# Patient Record
Sex: MALE | Race: WHITE | NOT HISPANIC OR LATINO | Employment: FULL TIME | URBAN - METROPOLITAN AREA
[De-identification: names, ages, dates, MRNs, and addresses within clinical notes are randomized per-mention and may not be internally consistent; named-entity substitution may affect disease eponyms.]

---

## 2018-01-11 NOTE — RESULT NOTES
Verified Results  * CT ABDOMEN PELVIS W CONTRAST [id number] 07:53AM William Ahumada Order Number: [id number]   Performing Comments: IV and oral contrast   - Patient Instructions: To schedule this appointment, please contact Central Scheduling at [phone]  Test Name Result Flag Reference   CT ABDOMEN PELVIS W CONTRAST (Report)     Additional finding as follows: There may be minimal pericolonic stranding adjacent to the sigmoid colon best seen on coronal images 93 through 97  Very mild acute diverticulitis is not excluded  ##cfslh   I personally discussed this result with Lanre Briones on 7/7/2016 2:06 PM    ##     CT ABDOMEN AND PELVIS WITH IV CONTRAST     INDICATION: Lower abdominal pain     COMPARISON: Report from a prior CT from 8/2/2012     TECHNIQUE: CT examination of the abdomen and pelvis was performed after the administration of intravenous contrast  This examination, like all CT scans performed in the [facility], was performed utilizing techniques to minimize    radiation dose exposure, including the use of iterative reconstruction and automated exposure control  Axial, sagittal, and coronal reformatted images were submitted for interpretation  100 cc of intravenous Omnipaque 350 was administered for this    examination  Enteric contrast was not given  FINDINGS:     ABDOMEN     LOWER CHEST: No significant abnormalities identified in the lower chest      LIVER/BILIARY TREE: Unremarkable  GALLBLADDER: No calcified gallstones  No pericholecystic inflammatory change  SPLEEN: Unremarkable  PANCREAS: Unremarkable  ADRENAL GLANDS: Unremarkable  KIDNEYS/URETERS: Unremarkable  No hydronephrosis  STOMACH AND BOWEL: There is sigmoid diverticulosis with wall thickening likely on the basis of smooth muscle hypertrophy  There is no surrounding stranding to suggest acute diverticulitis  Thickening was described on the prior report       APPENDIX: No findings to suggest appendicitis  ABDOMINOPELVIC CAVITY: No ascites or free intraperitoneal air  No lymphadenopathy  VESSELS: Unremarkable for patient's age  PELVIS     REPRODUCTIVE ORGANS: Unremarkable for patient's age  URINARY BLADDER: Unremarkable  ABDOMINAL WALL/INGUINAL REGIONS: Unremarkable  OSSEOUS STRUCTURES: No acute fracture or destructive osseous lesion  IMPRESSION:     Multiple sigmoid diverticula with wall thickening  This is likely secondary to smooth muscle hypertrophy in the setting of chronic diverticulosis  There is no inflammatory stranding to suggest acute diverticulitis  While the thickening was described    on the prior report, given the age of the patient, a sigmoidoscopy/colonoscopy is recommended to exclude underlying malignancy if this has not been recently performed       ##sigslh##sigslh            Workstation performed: [id number]     Signed by:   Mario An MD   7/7/16   100

## 2018-05-08 ENCOUNTER — OFFICE VISIT (OUTPATIENT)
Dept: INTERNAL MEDICINE CLINIC | Facility: CLINIC | Age: 53
End: 2018-05-08
Payer: COMMERCIAL

## 2018-05-08 VITALS
DIASTOLIC BLOOD PRESSURE: 82 MMHG | OXYGEN SATURATION: 97 % | SYSTOLIC BLOOD PRESSURE: 134 MMHG | WEIGHT: 187.2 LBS | BODY MASS INDEX: 32.13 KG/M2 | HEART RATE: 62 BPM

## 2018-05-08 DIAGNOSIS — Z13.228 SCREENING FOR METABOLIC DISORDER: ICD-10-CM

## 2018-05-08 DIAGNOSIS — M10.9 ACUTE GOUT INVOLVING TOE OF LEFT FOOT, UNSPECIFIED CAUSE: Primary | ICD-10-CM

## 2018-05-08 DIAGNOSIS — Z12.5 SCREENING FOR PROSTATE CANCER: ICD-10-CM

## 2018-05-08 DIAGNOSIS — Z13.0 SCREENING FOR DEFICIENCY ANEMIA: ICD-10-CM

## 2018-05-08 DIAGNOSIS — Z13.29 SCREENING FOR THYROID DISORDER: ICD-10-CM

## 2018-05-08 DIAGNOSIS — Z13.6 SCREENING FOR HEART DISEASE: ICD-10-CM

## 2018-05-08 PROCEDURE — 99214 OFFICE O/P EST MOD 30 MIN: CPT | Performed by: PHYSICIAN ASSISTANT

## 2018-05-08 RX ORDER — ACETAMINOPHEN 325 MG/1
TABLET ORAL
COMMUNITY
End: 2018-05-30

## 2018-05-08 NOTE — PROGRESS NOTES
Assessment/Plan:     gout, left great toe -I called the pharmacy to find out what the urgent care ordered for the patient  They ordered indomethacin and prednisone  I have instructed the patient to use this medication as ordered, take it with food  Follow up with Dr Margarito Damon to go over routine blood work orders which are given to the patient  No problem-specific Assessment & Plan notes found for this encounter  Diagnoses and all orders for this visit:    Acute gout involving toe of left foot, unspecified cause  -     Uric acid; Future    Screening for deficiency anemia  -     CBC and differential; Future    Screening for metabolic disorder  -     Comprehensive metabolic panel; Future    Screening for heart disease  -     Lipid panel; Future    Screening for thyroid disorder  -     TSH, 3rd generation with T4 reflex; Future    Screening for prostate cancer  -     PSA, Total Screen; Future    Other orders  -     acetaminophen (TYLENOL) 325 mg tablet; Take by mouth          Subjective:      Patient ID: Namrata Farias is a 46 y o  male  PATIENT COMES IN WITH COMPLAINTS OF PAIN IN HIS LEFT FOOT  IT IS FOCUSED AT THE BASE of his left great toe  It started a few days ago  He went to an urgent care today who told him they thought it might be gout and he should come to his doctor  He has not been seen by Dr Margarito Damon since 2012  There is no routine blood work in the chart either in epic, all scripts or [facility]  Other than his toe pain, the patient is feeling well and has no acute complaints  He has seen GI a few years ago for a colonoscopy and history of diverticulosis and diverticulitis  The following portions of the patient's history were reviewed and updated as appropriate: allergies, current medications, past family history, past medical history, past social history, past surgical history and problem list     Review of Systems   Constitutional: Negative for chills, fatigue and fever  Respiratory: Negative for cough, chest tightness and shortness of breath  Cardiovascular: Negative for chest pain and palpitations  Gastrointestinal: Negative for abdominal pain, diarrhea and nausea  Musculoskeletal: Positive for arthralgias and joint swelling  Tenderness, redness, swelling of the left great toe at the base         Objective:      /82   Pulse 62   Wt 84 9 kg (187 lb 3 2 oz)   SpO2 97%   BMI 32 13 kg/m²          Physical Exam   Constitutional: He is oriented to person, place, and time  He appears well-developed and well-nourished  HENT:   Head: Normocephalic and atraumatic  Right Ear: External ear normal    Left Ear: External ear normal    Mouth/Throat: Oropharynx is clear and moist  No oropharyngeal exudate  Eyes: Pupils are equal, round, and reactive to light  Neck: Normal range of motion  Neck supple  Cardiovascular: Normal rate, regular rhythm and normal heart sounds  Pulmonary/Chest: Effort normal and breath sounds normal  No respiratory distress  Abdominal: Soft  Bowel sounds are normal  There is no tenderness  Musculoskeletal:        Left foot: There is no tenderness  Feet:      Where indicated is tender, red and swollen   Lymphadenopathy:     He has no cervical adenopathy  Neurological: He is alert and oriented to person, place, and time  Skin: Skin is warm  Psychiatric: He has a normal mood and affect   His behavior is normal  Judgment normal

## 2018-05-10 LAB
ALBUMIN SERPL-MCNC: 4.3 G/DL (ref 3.5–5.5)
ALBUMIN/GLOB SERPL: 1.3 {RATIO} (ref 1.2–2.2)
ALP SERPL-CCNC: 46 IU/L (ref 39–117)
ALT SERPL-CCNC: 19 IU/L (ref 0–44)
AMBIG ABBREV DEFAULT: NORMAL
AMBIG ABBREV DEFAULT: NORMAL
AST SERPL-CCNC: 26 IU/L (ref 0–40)
BASOPHILS # BLD AUTO: 0 X10E3/UL (ref 0–0.2)
BASOPHILS NFR BLD AUTO: 0 %
BILIRUB SERPL-MCNC: 0.2 MG/DL (ref 0–1.2)
BUN SERPL-MCNC: 10 MG/DL (ref 6–24)
BUN/CREAT SERPL: 10 (ref 9–20)
CALCIUM SERPL-MCNC: 9.4 MG/DL (ref 8.7–10.2)
CHLORIDE SERPL-SCNC: 100 MMOL/L (ref 96–106)
CHOLEST SERPL-MCNC: 147 MG/DL (ref 100–199)
CO2 SERPL-SCNC: 21 MMOL/L (ref 18–29)
CREAT SERPL-MCNC: 1.05 MG/DL (ref 0.76–1.27)
EOSINOPHIL # BLD AUTO: 0.1 X10E3/UL (ref 0–0.4)
EOSINOPHIL NFR BLD AUTO: 1 %
ERYTHROCYTE [DISTWIDTH] IN BLOOD BY AUTOMATED COUNT: 14.1 % (ref 12.3–15.4)
GLOBULIN SER-MCNC: 3.3 G/DL (ref 1.5–4.5)
GLUCOSE SERPL-MCNC: 95 MG/DL (ref 65–99)
HCT VFR BLD AUTO: 42.8 % (ref 37.5–51)
HDLC SERPL-MCNC: 39 MG/DL
HGB BLD-MCNC: 14.4 G/DL (ref 13–17.7)
IMM GRANULOCYTES # BLD: 0 X10E3/UL (ref 0–0.1)
IMM GRANULOCYTES NFR BLD: 0 %
LDLC SERPL CALC-MCNC: 86 MG/DL (ref 0–99)
LYMPHOCYTES # BLD AUTO: 2.6 X10E3/UL (ref 0.7–3.1)
LYMPHOCYTES NFR BLD AUTO: 25 %
MCH RBC QN AUTO: 30.6 PG (ref 26.6–33)
MCHC RBC AUTO-ENTMCNC: 33.6 G/DL (ref 31.5–35.7)
MCV RBC AUTO: 91 FL (ref 79–97)
MONOCYTES # BLD AUTO: 0.7 X10E3/UL (ref 0.1–0.9)
MONOCYTES NFR BLD AUTO: 7 %
NEUTROPHILS # BLD AUTO: 7.1 X10E3/UL (ref 1.4–7)
NEUTROPHILS NFR BLD AUTO: 67 %
PLATELET # BLD AUTO: 192 X10E3/UL (ref 150–379)
POTASSIUM SERPL-SCNC: 4.8 MMOL/L (ref 3.5–5.2)
PROT SERPL-MCNC: 7.6 G/DL (ref 6–8.5)
PSA SERPL-MCNC: 1.9 NG/ML (ref 0–4)
RBC # BLD AUTO: 4.7 X10E6/UL (ref 4.14–5.8)
SL AMB EGFR AFRICAN AMERICAN: 94 ML/MIN/1.73
SL AMB EGFR NON AFRICAN AMERICAN: 81 ML/MIN/1.73
SL AMB VLDL CHOLESTEROL CALC: 22 MG/DL (ref 5–40)
SODIUM SERPL-SCNC: 141 MMOL/L (ref 134–144)
TRIGL SERPL-MCNC: 111 MG/DL (ref 0–149)
TSH SERPL DL<=0.005 MIU/L-ACNC: 2.68 UIU/ML (ref 0.45–4.5)
URATE SERPL-MCNC: 6.3 MG/DL (ref 3.7–8.6)
WBC # BLD AUTO: 10.6 X10E3/UL (ref 3.4–10.8)

## 2018-05-16 ENCOUNTER — OFFICE VISIT (OUTPATIENT)
Dept: INTERNAL MEDICINE CLINIC | Facility: CLINIC | Age: 53
End: 2018-05-16
Payer: COMMERCIAL

## 2018-05-16 VITALS
SYSTOLIC BLOOD PRESSURE: 128 MMHG | DIASTOLIC BLOOD PRESSURE: 80 MMHG | BODY MASS INDEX: 29.51 KG/M2 | HEIGHT: 67 IN | RESPIRATION RATE: 16 BRPM | OXYGEN SATURATION: 98 % | WEIGHT: 188 LBS | HEART RATE: 59 BPM

## 2018-05-16 DIAGNOSIS — M10.9 ACUTE GOUT INVOLVING TOE, UNSPECIFIED CAUSE, UNSPECIFIED LATERALITY: Primary | ICD-10-CM

## 2018-05-16 PROCEDURE — 99213 OFFICE O/P EST LOW 20 MIN: CPT | Performed by: PHYSICIAN ASSISTANT

## 2018-05-16 RX ORDER — INDOMETHACIN 50 MG/1
50 CAPSULE ORAL
COMMUNITY
Start: 2018-05-08 | End: 2018-05-30

## 2018-05-16 NOTE — PROGRESS NOTES
Assessment/Plan:      Gout-resolved  The patient's uric acid level is normal       All other blood work is normal       Patient is encouraged however to establish again with Dr Amrik Conde  The last time he was seen by him was 2012  I explained to him that he needs to see his doctor at least once every 3 years or more frequently as needed  He will make an appointment the next time his mother is due to come in  No problem-specific Assessment & Plan notes found for this encounter  There are no diagnoses linked to this encounter  Subjective:      Patient ID: Maggie Garcia is a 46 y o  male  The patient comes in to review blood work  He did his blood work last week  He was here last week for a problem with gout on his great toe  He was seen at an urgent care prior to seeing me and was given a course of prednisone and indomethacin  He took these medications and the gout improved  His blood work was completely normal including a CBC, CMP, lipid panel, TSH, PSA and uric acid level  His HDL was slightly low and he is encouraged to increase his exercise  The following portions of the patient's history were reviewed and updated as appropriate: allergies, current medications, past family history, past medical history, past social history, past surgical history and problem list     Review of Systems   Constitutional: Negative for chills, fatigue and fever  Respiratory: Negative for cough and shortness of breath  Cardiovascular: Negative for chest pain and palpitations  Gastrointestinal: Negative for abdominal pain, diarrhea and nausea  Musculoskeletal: Negative for arthralgias and joint swelling  Objective:      /80   Pulse 59   Resp 16   Ht 5' 7" (1 702 m)   Wt 85 3 kg (188 lb)   SpO2 98%   BMI 29 44 kg/m²          Physical Exam   Constitutional: He appears well-developed and well-nourished  Cardiovascular: Normal rate and normal heart sounds      Pulmonary/Chest: Effort normal and breath sounds normal    Abdominal: Soft  Bowel sounds are normal    Musculoskeletal: He exhibits no edema or tenderness

## 2018-05-30 ENCOUNTER — OFFICE VISIT (OUTPATIENT)
Dept: INTERNAL MEDICINE CLINIC | Facility: CLINIC | Age: 53
End: 2018-05-30
Payer: COMMERCIAL

## 2018-05-30 VITALS
BODY MASS INDEX: 29.19 KG/M2 | WEIGHT: 186 LBS | RESPIRATION RATE: 16 BRPM | HEIGHT: 67 IN | HEART RATE: 82 BPM | OXYGEN SATURATION: 97 % | DIASTOLIC BLOOD PRESSURE: 80 MMHG | SYSTOLIC BLOOD PRESSURE: 120 MMHG

## 2018-05-30 DIAGNOSIS — Z12.5 SCREENING FOR PROSTATE CANCER: Primary | ICD-10-CM

## 2018-05-30 PROBLEM — K57.30 DIVERTICULOSIS OF LARGE INTESTINE: Status: ACTIVE | Noted: 2018-05-30

## 2018-05-30 PROBLEM — M10.9 PODAGRA: Status: RESOLVED | Noted: 2018-05-30 | Resolved: 2018-05-30

## 2018-05-30 PROBLEM — K57.92 DIVERTICULITIS: Status: RESOLVED | Noted: 2018-05-30 | Resolved: 2018-05-30

## 2018-05-30 PROBLEM — M10.9 PODAGRA: Status: ACTIVE | Noted: 2018-05-30

## 2018-05-30 PROBLEM — Z00.00 HEALTHCARE MAINTENANCE: Status: ACTIVE | Noted: 2018-05-30

## 2018-05-30 PROCEDURE — 1036F TOBACCO NON-USER: CPT | Performed by: INTERNAL MEDICINE

## 2018-05-30 PROCEDURE — 3008F BODY MASS INDEX DOCD: CPT | Performed by: INTERNAL MEDICINE

## 2018-05-30 PROCEDURE — 99213 OFFICE O/P EST LOW 20 MIN: CPT | Performed by: INTERNAL MEDICINE

## 2018-05-30 NOTE — PROGRESS NOTES
Assessment/Plan:       There are no diagnoses linked to this encounter  Subjective:      Patient ID: Joshua Shannon is a 46 y o  male  A 26-year-old man  I had seen him 6 years ago  He was well  Then, he was seen again here by the [de-identified] assistant for acute left foot podagra x2 visits  This was treated with steroids with resolution of symptoms to about 90% but he still has some residual pain and tenderness of the left metatarsophalangeal joint 1  He is known to have diverticulosis and every now and then gets flares of  crampy left lower quadrant pain  However, he has never required any antibiotic therapy  Most recent colonoscopy done June 2016  As a part of visit assessments 2 weeks ago, he had extensive laboratory testing including PSA, all of which was normal       Currently taking only Tylenol for the podagra  Like so many people, he now works with computers  The following portions of the patient's history were reviewed and updated as appropriate:   He has a past medical history of Diverticulitis  ,   does not have a problem list on file  ,   has a past surgical history that includes No past surgeries  ,  family history includes Anemia in his mother; Asthma in his mother; COPD in his mother; Diverticulitis in his sister; Hypertension in his father; Lung cancer in his father  ,   reports that he has never smoked  He has never used smokeless tobacco  He reports that he drinks alcohol  He reports that he does not use drugs  ,  has No Known Allergies     Current Outpatient Prescriptions   Medication Sig Dispense Refill    acetaminophen (TYLENOL) 325 mg tablet Take by mouth      indomethacin (INDOCIN) 50 mg capsule Take 50 mg by mouth       No current facility-administered medications for this visit  Review of Systems   Constitutional: Negative for chills and fever  HENT: Negative for sore throat and trouble swallowing  Eyes: Negative for pain     Respiratory: Negative for cough and shortness of breath  Cardiovascular: Negative for chest pain and palpitations  Gastrointestinal: Negative for abdominal pain, blood in stool, diarrhea, nausea and vomiting  Endocrine: Negative for cold intolerance and heat intolerance  Genitourinary: Negative for dysuria, frequency and testicular pain  Musculoskeletal: Positive for arthralgias  Negative for joint swelling  Allergic/Immunologic: Negative for immunocompromised state  Neurological: Negative for dizziness, syncope and headaches  Hematological: Negative for adenopathy  Does not bruise/bleed easily  Psychiatric/Behavioral: Negative for dysphoric mood  The patient is not nervous/anxious  Objective:  Vitals:    05/30/18 1704   BP: 120/80   Pulse: 82   Resp: 16   SpO2: 97%      Physical Exam   Constitutional: He is oriented to person, place, and time  He appears well-developed and well-nourished  HENT:   Head: Normocephalic and atraumatic  Eyes: Pupils are equal, round, and reactive to light  Neck: Normal range of motion  Neck supple  No tracheal deviation present  No thyromegaly present  Cardiovascular: Normal rate, regular rhythm and normal heart sounds  Exam reveals no gallop  No murmur heard  Pulmonary/Chest: Effort normal  No respiratory distress  He has no wheezes  He has no rales  Abdominal: Soft  Bowel sounds are normal  There is no tenderness  Musculoskeletal: Normal range of motion  He exhibits tenderness  He exhibits no deformity  A bit of tenderness to palpation of the lateral aspect of the left 1st metatarsophalangeal joint but there is no visible redness warmth heat or swelling    Neurological: He is alert and oriented to person, place, and time  He has normal reflexes  Coordination normal    Skin: Skin is warm and dry  Psychiatric: He has a normal mood and affect

## 2018-05-30 NOTE — PATIENT INSTRUCTIONS
A 49-year-old man with no chronic problems with the exception of this podagra  Recommendation is to use Aleve or Advil a few times a day with the expectation that it will gradually disappear  If he gets rare to occasional episodes that is acceptable but it these episodes become more frequent or the pain becomes constant he should call    Frankly, I would say come back at the age of 47 peer

## 2019-03-29 ENCOUNTER — TELEPHONE (OUTPATIENT)
Dept: INTERNAL MEDICINE CLINIC | Facility: CLINIC | Age: 54
End: 2019-03-29

## 2019-05-16 ENCOUNTER — APPOINTMENT (EMERGENCY)
Dept: CT IMAGING | Facility: HOSPITAL | Age: 54
End: 2019-05-16
Payer: COMMERCIAL

## 2019-05-16 ENCOUNTER — HOSPITAL ENCOUNTER (EMERGENCY)
Facility: HOSPITAL | Age: 54
Discharge: HOME | End: 2019-05-17
Attending: EMERGENCY MEDICINE | Admitting: EMERGENCY MEDICINE
Payer: COMMERCIAL

## 2019-05-16 VITALS
WEIGHT: 167.99 LBS | RESPIRATION RATE: 18 BRPM | HEIGHT: 67 IN | OXYGEN SATURATION: 93 % | DIASTOLIC BLOOD PRESSURE: 77 MMHG | TEMPERATURE: 98.5 F | BODY MASS INDEX: 26.37 KG/M2 | SYSTOLIC BLOOD PRESSURE: 135 MMHG | HEART RATE: 60 BPM

## 2019-05-16 DIAGNOSIS — R11.2 NAUSEA & VOMITING: ICD-10-CM

## 2019-05-16 DIAGNOSIS — K57.92 DIVERTICULITIS: Primary | ICD-10-CM

## 2019-05-16 LAB
ALBUMIN SERPL BCP-MCNC: 3.7 G/DL (ref 3.5–5)
ALP SERPL-CCNC: 42 U/L (ref 46–116)
ALT SERPL W P-5'-P-CCNC: 31 U/L (ref 12–78)
ANION GAP SERPL CALCULATED.3IONS-SCNC: 9 MMOL/L (ref 4–13)
AST SERPL W P-5'-P-CCNC: 34 U/L (ref 5–45)
BASOPHILS # BLD AUTO: 0.03 THOUSANDS/ΜL (ref 0–0.1)
BASOPHILS NFR BLD AUTO: 0 % (ref 0–1)
BILIRUB SERPL-MCNC: 0.7 MG/DL (ref 0.2–1)
BUN SERPL-MCNC: 14 MG/DL (ref 5–25)
CALCIUM SERPL-MCNC: 9.3 MG/DL (ref 8.3–10.1)
CHLORIDE SERPL-SCNC: 101 MMOL/L (ref 100–108)
CO2 SERPL-SCNC: 30 MMOL/L (ref 21–32)
CREAT SERPL-MCNC: 1.12 MG/DL (ref 0.6–1.3)
EOSINOPHIL # BLD AUTO: 0.03 THOUSAND/ΜL (ref 0–0.61)
EOSINOPHIL NFR BLD AUTO: 0 % (ref 0–6)
ERYTHROCYTE [DISTWIDTH] IN BLOOD BY AUTOMATED COUNT: 12.4 % (ref 11.6–15.1)
GFR SERPL CREATININE-BSD FRML MDRD: 75 ML/MIN/1.73SQ M
GLUCOSE SERPL-MCNC: 95 MG/DL (ref 65–140)
HCT VFR BLD AUTO: 50.2 % (ref 36.5–49.3)
HGB BLD-MCNC: 16.7 G/DL (ref 12–17)
IMM GRANULOCYTES # BLD AUTO: 0.03 THOUSAND/UL (ref 0–0.2)
IMM GRANULOCYTES NFR BLD AUTO: 0 % (ref 0–2)
LIPASE SERPL-CCNC: 93 U/L (ref 73–393)
LYMPHOCYTES # BLD AUTO: 1.47 THOUSANDS/ΜL (ref 0.6–4.47)
LYMPHOCYTES NFR BLD AUTO: 17 % (ref 14–44)
MCH RBC QN AUTO: 30.4 PG (ref 26.8–34.3)
MCHC RBC AUTO-ENTMCNC: 33.3 G/DL (ref 31.4–37.4)
MCV RBC AUTO: 91 FL (ref 82–98)
MONOCYTES # BLD AUTO: 0.52 THOUSAND/ΜL (ref 0.17–1.22)
MONOCYTES NFR BLD AUTO: 6 % (ref 4–12)
NEUTROPHILS # BLD AUTO: 6.66 THOUSANDS/ΜL (ref 1.85–7.62)
NEUTS SEG NFR BLD AUTO: 77 % (ref 43–75)
NRBC BLD AUTO-RTO: 0 /100 WBCS
PLATELET # BLD AUTO: 230 THOUSANDS/UL (ref 149–390)
PMV BLD AUTO: 10.6 FL (ref 8.9–12.7)
POTASSIUM SERPL-SCNC: 4.5 MMOL/L (ref 3.5–5.3)
PROT SERPL-MCNC: 8.4 G/DL (ref 6.4–8.2)
RBC # BLD AUTO: 5.5 MILLION/UL (ref 3.88–5.62)
SODIUM SERPL-SCNC: 140 MMOL/L (ref 136–145)
WBC # BLD AUTO: 8.74 THOUSAND/UL (ref 4.31–10.16)

## 2019-05-16 PROCEDURE — 83690 ASSAY OF LIPASE: CPT | Performed by: EMERGENCY MEDICINE

## 2019-05-16 PROCEDURE — 96361 HYDRATE IV INFUSION ADD-ON: CPT

## 2019-05-16 PROCEDURE — 74177 CT ABD & PELVIS W/CONTRAST: CPT

## 2019-05-16 PROCEDURE — 96374 THER/PROPH/DIAG INJ IV PUSH: CPT

## 2019-05-16 PROCEDURE — 80053 COMPREHEN METABOLIC PANEL: CPT | Performed by: EMERGENCY MEDICINE

## 2019-05-16 PROCEDURE — 99284 EMERGENCY DEPT VISIT MOD MDM: CPT | Performed by: EMERGENCY MEDICINE

## 2019-05-16 PROCEDURE — 85025 COMPLETE CBC W/AUTO DIFF WBC: CPT | Performed by: EMERGENCY MEDICINE

## 2019-05-16 PROCEDURE — 96375 TX/PRO/DX INJ NEW DRUG ADDON: CPT

## 2019-05-16 PROCEDURE — 36415 COLL VENOUS BLD VENIPUNCTURE: CPT

## 2019-05-16 PROCEDURE — 99284 EMERGENCY DEPT VISIT MOD MDM: CPT

## 2019-05-16 RX ORDER — KETOROLAC TROMETHAMINE 30 MG/ML
15 INJECTION, SOLUTION INTRAMUSCULAR; INTRAVENOUS ONCE
Status: COMPLETED | OUTPATIENT
Start: 2019-05-16 | End: 2019-05-16

## 2019-05-16 RX ORDER — CIPROFLOXACIN 500 MG/1
500 TABLET, FILM COATED ORAL ONCE
Status: COMPLETED | OUTPATIENT
Start: 2019-05-16 | End: 2019-05-16

## 2019-05-16 RX ORDER — ONDANSETRON 2 MG/ML
4 INJECTION INTRAMUSCULAR; INTRAVENOUS ONCE
Status: COMPLETED | OUTPATIENT
Start: 2019-05-16 | End: 2019-05-16

## 2019-05-16 RX ORDER — METRONIDAZOLE 500 MG/1
500 TABLET ORAL ONCE
Status: COMPLETED | OUTPATIENT
Start: 2019-05-16 | End: 2019-05-16

## 2019-05-16 RX ORDER — METRONIDAZOLE 500 MG/1
500 TABLET ORAL EVERY 8 HOURS SCHEDULED
Qty: 30 TABLET | Refills: 0 | Status: SHIPPED | OUTPATIENT
Start: 2019-05-16 | End: 2019-05-26

## 2019-05-16 RX ORDER — OXYCODONE HYDROCHLORIDE AND ACETAMINOPHEN 5; 325 MG/1; MG/1
1 TABLET ORAL EVERY 4 HOURS PRN
Qty: 10 TABLET | Refills: 0 | Status: SHIPPED | OUTPATIENT
Start: 2019-05-16 | End: 2019-05-21

## 2019-05-16 RX ORDER — CIPROFLOXACIN 500 MG/1
500 TABLET, FILM COATED ORAL EVERY 12 HOURS SCHEDULED
Qty: 20 TABLET | Refills: 0 | Status: SHIPPED | OUTPATIENT
Start: 2019-05-16 | End: 2019-05-26

## 2019-05-16 RX ORDER — ONDANSETRON 4 MG/1
4 TABLET, ORALLY DISINTEGRATING ORAL EVERY 6 HOURS PRN
Qty: 20 TABLET | Refills: 0 | Status: SHIPPED | OUTPATIENT
Start: 2019-05-16 | End: 2021-08-10 | Stop reason: ALTCHOICE

## 2019-05-16 RX ADMIN — METRONIDAZOLE 500 MG: 500 TABLET, FILM COATED ORAL at 23:56

## 2019-05-16 RX ADMIN — KETOROLAC TROMETHAMINE 15 MG: 30 INJECTION, SOLUTION INTRAMUSCULAR at 20:19

## 2019-05-16 RX ADMIN — IOHEXOL 100 ML: 350 INJECTION, SOLUTION INTRAVENOUS at 20:54

## 2019-05-16 RX ADMIN — SODIUM CHLORIDE 1000 ML: 0.9 INJECTION, SOLUTION INTRAVENOUS at 20:18

## 2019-05-16 RX ADMIN — CIPROFLOXACIN HYDROCHLORIDE 500 MG: 500 TABLET, FILM COATED ORAL at 23:56

## 2019-05-16 RX ADMIN — ONDANSETRON 4 MG: 2 INJECTION INTRAMUSCULAR; INTRAVENOUS at 22:07

## 2019-05-29 ENCOUNTER — OFFICE VISIT (OUTPATIENT)
Dept: GASTROENTEROLOGY | Facility: CLINIC | Age: 54
End: 2019-05-29
Payer: COMMERCIAL

## 2019-05-29 VITALS
RESPIRATION RATE: 16 BRPM | SYSTOLIC BLOOD PRESSURE: 114 MMHG | HEIGHT: 67 IN | HEART RATE: 63 BPM | BODY MASS INDEX: 26.06 KG/M2 | DIASTOLIC BLOOD PRESSURE: 72 MMHG | WEIGHT: 166 LBS

## 2019-05-29 DIAGNOSIS — K57.92 DIVERTICULITIS: Primary | ICD-10-CM

## 2019-05-29 PROCEDURE — 99213 OFFICE O/P EST LOW 20 MIN: CPT | Performed by: PHYSICIAN ASSISTANT

## 2019-06-17 ENCOUNTER — HOSPITAL ENCOUNTER (INPATIENT)
Facility: HOSPITAL | Age: 54
LOS: 1 days | Discharge: HOME | DRG: 244 | End: 2019-06-19
Attending: EMERGENCY MEDICINE | Admitting: INTERNAL MEDICINE
Payer: COMMERCIAL

## 2019-06-17 DIAGNOSIS — Z78.9 FAILURE OF OUTPATIENT TREATMENT: ICD-10-CM

## 2019-06-17 DIAGNOSIS — K57.32 DIVERTICULITIS OF SIGMOID COLON: Primary | ICD-10-CM

## 2019-06-17 PROCEDURE — 99285 EMERGENCY DEPT VISIT HI MDM: CPT

## 2019-06-17 RX ORDER — MORPHINE SULFATE 4 MG/ML
4 INJECTION, SOLUTION INTRAMUSCULAR; INTRAVENOUS ONCE
Status: COMPLETED | OUTPATIENT
Start: 2019-06-18 | End: 2019-06-18

## 2019-06-17 RX ORDER — ONDANSETRON 2 MG/ML
4 INJECTION INTRAMUSCULAR; INTRAVENOUS ONCE
Status: COMPLETED | OUTPATIENT
Start: 2019-06-18 | End: 2019-06-18

## 2019-06-18 ENCOUNTER — APPOINTMENT (EMERGENCY)
Dept: CT IMAGING | Facility: HOSPITAL | Age: 54
DRG: 244 | End: 2019-06-18
Payer: COMMERCIAL

## 2019-06-18 LAB
ALBUMIN SERPL BCP-MCNC: 3.8 G/DL (ref 3.5–5)
ALP SERPL-CCNC: 49 U/L (ref 46–116)
ALT SERPL W P-5'-P-CCNC: 26 U/L (ref 12–78)
ANION GAP SERPL CALCULATED.3IONS-SCNC: 13 MMOL/L (ref 4–13)
ANION GAP SERPL CALCULATED.3IONS-SCNC: 13 MMOL/L (ref 4–13)
AST SERPL W P-5'-P-CCNC: 23 U/L (ref 5–45)
ATRIAL RATE: 53 BPM
BACTERIA UR QL AUTO: ABNORMAL /HPF
BASOPHILS # BLD AUTO: 0.01 THOUSANDS/ΜL (ref 0–0.1)
BASOPHILS # BLD AUTO: 0.03 THOUSANDS/ΜL (ref 0–0.1)
BASOPHILS NFR BLD AUTO: 0 % (ref 0–1)
BASOPHILS NFR BLD AUTO: 0 % (ref 0–1)
BILIRUB DIRECT SERPL-MCNC: 0.15 MG/DL (ref 0–0.2)
BILIRUB SERPL-MCNC: 0.5 MG/DL (ref 0.2–1)
BILIRUB UR QL STRIP: NEGATIVE
BUN SERPL-MCNC: 10 MG/DL (ref 5–25)
BUN SERPL-MCNC: 9 MG/DL (ref 5–25)
CALCIUM SERPL-MCNC: 8.9 MG/DL (ref 8.3–10.1)
CALCIUM SERPL-MCNC: 9.2 MG/DL (ref 8.3–10.1)
CHLORIDE SERPL-SCNC: 101 MMOL/L (ref 100–108)
CHLORIDE SERPL-SCNC: 103 MMOL/L (ref 100–108)
CLARITY UR: CLEAR
CO2 SERPL-SCNC: 25 MMOL/L (ref 21–32)
CO2 SERPL-SCNC: 26 MMOL/L (ref 21–32)
COARSE GRAN CASTS URNS QL MICRO: ABNORMAL /LPF
COLOR UR: YELLOW
CREAT SERPL-MCNC: 1.03 MG/DL (ref 0.6–1.3)
CREAT SERPL-MCNC: 1.04 MG/DL (ref 0.6–1.3)
EOSINOPHIL # BLD AUTO: 0.03 THOUSAND/ΜL (ref 0–0.61)
EOSINOPHIL # BLD AUTO: 0.03 THOUSAND/ΜL (ref 0–0.61)
EOSINOPHIL NFR BLD AUTO: 0 % (ref 0–6)
EOSINOPHIL NFR BLD AUTO: 0 % (ref 0–6)
ERYTHROCYTE [DISTWIDTH] IN BLOOD BY AUTOMATED COUNT: 12.3 % (ref 11.6–15.1)
ERYTHROCYTE [DISTWIDTH] IN BLOOD BY AUTOMATED COUNT: 12.5 % (ref 11.6–15.1)
GFR SERPL CREATININE-BSD FRML MDRD: 82 ML/MIN/1.73SQ M
GFR SERPL CREATININE-BSD FRML MDRD: 83 ML/MIN/1.73SQ M
GLUCOSE P FAST SERPL-MCNC: 117 MG/DL (ref 65–99)
GLUCOSE SERPL-MCNC: 117 MG/DL (ref 65–140)
GLUCOSE SERPL-MCNC: 121 MG/DL (ref 65–140)
GLUCOSE UR STRIP-MCNC: NEGATIVE MG/DL
HCT VFR BLD AUTO: 40.8 % (ref 36.5–49.3)
HCT VFR BLD AUTO: 44.5 % (ref 36.5–49.3)
HGB BLD-MCNC: 13.4 G/DL (ref 12–17)
HGB BLD-MCNC: 14.7 G/DL (ref 12–17)
HGB UR QL STRIP.AUTO: NEGATIVE
HYALINE CASTS #/AREA URNS LPF: ABNORMAL /LPF
IMM GRANULOCYTES # BLD AUTO: 0.02 THOUSAND/UL (ref 0–0.2)
IMM GRANULOCYTES # BLD AUTO: 0.03 THOUSAND/UL (ref 0–0.2)
IMM GRANULOCYTES NFR BLD AUTO: 0 % (ref 0–2)
IMM GRANULOCYTES NFR BLD AUTO: 0 % (ref 0–2)
KETONES UR STRIP-MCNC: ABNORMAL MG/DL
LACTATE SERPL-SCNC: 1.4 MMOL/L (ref 0.5–2)
LEUKOCYTE ESTERASE UR QL STRIP: NEGATIVE
LIPASE SERPL-CCNC: 129 U/L (ref 73–393)
LYMPHOCYTES # BLD AUTO: 1 THOUSANDS/ΜL (ref 0.6–4.47)
LYMPHOCYTES # BLD AUTO: 1.84 THOUSANDS/ΜL (ref 0.6–4.47)
LYMPHOCYTES NFR BLD AUTO: 10 % (ref 14–44)
LYMPHOCYTES NFR BLD AUTO: 17 % (ref 14–44)
MAGNESIUM SERPL-MCNC: 1.9 MG/DL (ref 1.6–2.6)
MAGNESIUM SERPL-MCNC: 1.9 MG/DL (ref 1.6–2.6)
MCH RBC QN AUTO: 30.5 PG (ref 26.8–34.3)
MCH RBC QN AUTO: 30.6 PG (ref 26.8–34.3)
MCHC RBC AUTO-ENTMCNC: 32.8 G/DL (ref 31.4–37.4)
MCHC RBC AUTO-ENTMCNC: 33 G/DL (ref 31.4–37.4)
MCV RBC AUTO: 93 FL (ref 82–98)
MCV RBC AUTO: 93 FL (ref 82–98)
MONOCYTES # BLD AUTO: 0.31 THOUSAND/ΜL (ref 0.17–1.22)
MONOCYTES # BLD AUTO: 0.69 THOUSAND/ΜL (ref 0.17–1.22)
MONOCYTES NFR BLD AUTO: 3 % (ref 4–12)
MONOCYTES NFR BLD AUTO: 6 % (ref 4–12)
NEUTROPHILS # BLD AUTO: 8.29 THOUSANDS/ΜL (ref 1.85–7.62)
NEUTROPHILS # BLD AUTO: 9.18 THOUSANDS/ΜL (ref 1.85–7.62)
NEUTS SEG NFR BLD AUTO: 77 % (ref 43–75)
NEUTS SEG NFR BLD AUTO: 87 % (ref 43–75)
NITRITE UR QL STRIP: NEGATIVE
NON-SQ EPI CELLS URNS QL MICRO: ABNORMAL /HPF
NRBC BLD AUTO-RTO: 0 /100 WBCS
NRBC BLD AUTO-RTO: 0 /100 WBCS
P AXIS: 38 DEGREES
PH UR STRIP.AUTO: 8 [PH]
PLATELET # BLD AUTO: 181 THOUSANDS/UL (ref 149–390)
PLATELET # BLD AUTO: 186 THOUSANDS/UL (ref 149–390)
PMV BLD AUTO: 10.6 FL (ref 8.9–12.7)
PMV BLD AUTO: 10.6 FL (ref 8.9–12.7)
POTASSIUM SERPL-SCNC: 3.8 MMOL/L (ref 3.5–5.3)
POTASSIUM SERPL-SCNC: 3.8 MMOL/L (ref 3.5–5.3)
PR INTERVAL: 140 MS
PROT SERPL-MCNC: 8.7 G/DL (ref 6.4–8.2)
PROT UR STRIP-MCNC: ABNORMAL MG/DL
QRS AXIS: 34 DEGREES
QRSD INTERVAL: 82 MS
QT INTERVAL: 460 MS
QTC INTERVAL: 431 MS
RBC # BLD AUTO: 4.4 MILLION/UL (ref 3.88–5.62)
RBC # BLD AUTO: 4.81 MILLION/UL (ref 3.88–5.62)
RBC #/AREA URNS AUTO: ABNORMAL /HPF
SODIUM SERPL-SCNC: 140 MMOL/L (ref 136–145)
SODIUM SERPL-SCNC: 141 MMOL/L (ref 136–145)
SP GR UR STRIP.AUTO: 1.02 (ref 1–1.03)
T WAVE AXIS: -21 DEGREES
TROPONIN I SERPL-MCNC: <0.02 NG/ML
UROBILINOGEN UR QL STRIP.AUTO: 0.2 E.U./DL
VENTRICULAR RATE: 53 BPM
WBC # BLD AUTO: 10.57 THOUSAND/UL (ref 4.31–10.16)
WBC # BLD AUTO: 10.89 THOUSAND/UL (ref 4.31–10.16)
WBC #/AREA URNS AUTO: ABNORMAL /HPF

## 2019-06-18 PROCEDURE — 80048 BASIC METABOLIC PNL TOTAL CA: CPT | Performed by: EMERGENCY MEDICINE

## 2019-06-18 PROCEDURE — 36415 COLL VENOUS BLD VENIPUNCTURE: CPT | Performed by: EMERGENCY MEDICINE

## 2019-06-18 PROCEDURE — 80076 HEPATIC FUNCTION PANEL: CPT | Performed by: EMERGENCY MEDICINE

## 2019-06-18 PROCEDURE — 93005 ELECTROCARDIOGRAM TRACING: CPT

## 2019-06-18 PROCEDURE — 85025 COMPLETE CBC W/AUTO DIFF WBC: CPT | Performed by: EMERGENCY MEDICINE

## 2019-06-18 PROCEDURE — 96361 HYDRATE IV INFUSION ADD-ON: CPT

## 2019-06-18 PROCEDURE — 83605 ASSAY OF LACTIC ACID: CPT | Performed by: EMERGENCY MEDICINE

## 2019-06-18 PROCEDURE — 93010 ELECTROCARDIOGRAM REPORT: CPT | Performed by: INTERNAL MEDICINE

## 2019-06-18 PROCEDURE — 96375 TX/PRO/DX INJ NEW DRUG ADDON: CPT

## 2019-06-18 PROCEDURE — 96365 THER/PROPH/DIAG IV INF INIT: CPT

## 2019-06-18 PROCEDURE — 81001 URINALYSIS AUTO W/SCOPE: CPT | Performed by: EMERGENCY MEDICINE

## 2019-06-18 PROCEDURE — 80048 BASIC METABOLIC PNL TOTAL CA: CPT | Performed by: HOSPITALIST

## 2019-06-18 PROCEDURE — 99220 PR INITIAL OBSERVATION CARE/DAY 70 MINUTES: CPT | Performed by: HOSPITALIST

## 2019-06-18 PROCEDURE — 83735 ASSAY OF MAGNESIUM: CPT | Performed by: EMERGENCY MEDICINE

## 2019-06-18 PROCEDURE — 99254 IP/OBS CNSLTJ NEW/EST MOD 60: CPT | Performed by: INTERNAL MEDICINE

## 2019-06-18 PROCEDURE — 85025 COMPLETE CBC W/AUTO DIFF WBC: CPT | Performed by: HOSPITALIST

## 2019-06-18 PROCEDURE — 83735 ASSAY OF MAGNESIUM: CPT | Performed by: HOSPITALIST

## 2019-06-18 PROCEDURE — 83690 ASSAY OF LIPASE: CPT | Performed by: EMERGENCY MEDICINE

## 2019-06-18 PROCEDURE — 99285 EMERGENCY DEPT VISIT HI MDM: CPT | Performed by: EMERGENCY MEDICINE

## 2019-06-18 PROCEDURE — 84484 ASSAY OF TROPONIN QUANT: CPT | Performed by: EMERGENCY MEDICINE

## 2019-06-18 PROCEDURE — 74177 CT ABD & PELVIS W/CONTRAST: CPT

## 2019-06-18 RX ORDER — SODIUM CHLORIDE 9 MG/ML
75 INJECTION, SOLUTION INTRAVENOUS CONTINUOUS
Status: DISCONTINUED | OUTPATIENT
Start: 2019-06-18 | End: 2019-06-19 | Stop reason: HOSPADM

## 2019-06-18 RX ORDER — ACETAMINOPHEN 325 MG/1
650 TABLET ORAL EVERY 6 HOURS PRN
Status: DISCONTINUED | OUTPATIENT
Start: 2019-06-18 | End: 2019-06-19 | Stop reason: HOSPADM

## 2019-06-18 RX ORDER — ONDANSETRON 4 MG/1
4 TABLET, ORALLY DISINTEGRATING ORAL EVERY 6 HOURS PRN
Status: DISCONTINUED | OUTPATIENT
Start: 2019-06-18 | End: 2019-06-19 | Stop reason: HOSPADM

## 2019-06-18 RX ORDER — DICYCLOMINE HYDROCHLORIDE 10 MG/1
10 CAPSULE ORAL EVERY 6 HOURS PRN
Status: DISCONTINUED | OUTPATIENT
Start: 2019-06-18 | End: 2019-06-19 | Stop reason: HOSPADM

## 2019-06-18 RX ADMIN — ENOXAPARIN SODIUM 40 MG: 40 INJECTION SUBCUTANEOUS at 08:19

## 2019-06-18 RX ADMIN — PIPERACILLIN SODIUM,TAZOBACTAM SODIUM 3.38 G: 3; .375 INJECTION, POWDER, FOR SOLUTION INTRAVENOUS at 07:53

## 2019-06-18 RX ADMIN — MORPHINE SULFATE 4 MG: 4 INJECTION INTRAVENOUS at 00:35

## 2019-06-18 RX ADMIN — SODIUM CHLORIDE 125 ML/HR: 0.9 INJECTION, SOLUTION INTRAVENOUS at 06:12

## 2019-06-18 RX ADMIN — PIPERACILLIN SODIUM,TAZOBACTAM SODIUM 4.5 G: 4; .5 INJECTION, POWDER, FOR SOLUTION INTRAVENOUS at 01:07

## 2019-06-18 RX ADMIN — PIPERACILLIN SODIUM,TAZOBACTAM SODIUM 3.38 G: 3; .375 INJECTION, POWDER, FOR SOLUTION INTRAVENOUS at 15:34

## 2019-06-18 RX ADMIN — PIPERACILLIN SODIUM,TAZOBACTAM SODIUM 3.38 G: 3; .375 INJECTION, POWDER, FOR SOLUTION INTRAVENOUS at 18:37

## 2019-06-18 RX ADMIN — SODIUM CHLORIDE 125 ML/HR: 0.9 INJECTION, SOLUTION INTRAVENOUS at 22:36

## 2019-06-18 RX ADMIN — ONDANSETRON 4 MG: 2 INJECTION INTRAMUSCULAR; INTRAVENOUS at 00:35

## 2019-06-18 RX ADMIN — IOHEXOL 100 ML: 350 INJECTION, SOLUTION INTRAVENOUS at 01:33

## 2019-06-18 RX ADMIN — DICYCLOMINE HYDROCHLORIDE 10 MG: 10 CAPSULE ORAL at 22:39

## 2019-06-18 RX ADMIN — SODIUM CHLORIDE 1000 ML: 0.9 INJECTION, SOLUTION INTRAVENOUS at 00:14

## 2019-06-19 ENCOUNTER — TRANSITIONAL CARE MANAGEMENT (OUTPATIENT)
Dept: INTERNAL MEDICINE CLINIC | Facility: CLINIC | Age: 54
End: 2019-06-19

## 2019-06-19 ENCOUNTER — TELEPHONE (OUTPATIENT)
Dept: GASTROENTEROLOGY | Facility: CLINIC | Age: 54
End: 2019-06-19

## 2019-06-19 VITALS
WEIGHT: 161 LBS | HEART RATE: 49 BPM | OXYGEN SATURATION: 98 % | BODY MASS INDEX: 25.27 KG/M2 | RESPIRATION RATE: 18 BRPM | SYSTOLIC BLOOD PRESSURE: 136 MMHG | TEMPERATURE: 97.9 F | DIASTOLIC BLOOD PRESSURE: 74 MMHG | HEIGHT: 67 IN

## 2019-06-19 LAB
ERYTHROCYTE [DISTWIDTH] IN BLOOD BY AUTOMATED COUNT: 12.8 % (ref 11.6–15.1)
HCT VFR BLD AUTO: 37.1 % (ref 36.5–49.3)
HGB BLD-MCNC: 11.9 G/DL (ref 12–17)
MCH RBC QN AUTO: 30.7 PG (ref 26.8–34.3)
MCHC RBC AUTO-ENTMCNC: 32.1 G/DL (ref 31.4–37.4)
MCV RBC AUTO: 96 FL (ref 82–98)
PLATELET # BLD AUTO: 147 THOUSANDS/UL (ref 149–390)
PMV BLD AUTO: 10.7 FL (ref 8.9–12.7)
RBC # BLD AUTO: 3.88 MILLION/UL (ref 3.88–5.62)
WBC # BLD AUTO: 8.59 THOUSAND/UL (ref 4.31–10.16)

## 2019-06-19 PROCEDURE — 99232 SBSQ HOSP IP/OBS MODERATE 35: CPT | Performed by: INTERNAL MEDICINE

## 2019-06-19 PROCEDURE — 99238 HOSP IP/OBS DSCHRG MGMT 30/<: CPT | Performed by: NURSE PRACTITIONER

## 2019-06-19 PROCEDURE — 85027 COMPLETE CBC AUTOMATED: CPT | Performed by: PHYSICIAN ASSISTANT

## 2019-06-19 RX ORDER — AMOXICILLIN AND CLAVULANATE POTASSIUM 875; 125 MG/1; MG/1
1 TABLET, FILM COATED ORAL EVERY 8 HOURS
Qty: 42 TABLET | Refills: 0 | Status: SHIPPED | OUTPATIENT
Start: 2019-06-19 | End: 2019-07-03

## 2019-06-19 RX ORDER — DICYCLOMINE HYDROCHLORIDE 10 MG/1
10 CAPSULE ORAL EVERY 6 HOURS PRN
Qty: 10 CAPSULE | Refills: 0 | Status: SHIPPED | OUTPATIENT
Start: 2019-06-19 | End: 2021-08-10 | Stop reason: ALTCHOICE

## 2019-06-19 RX ORDER — AMOXICILLIN AND CLAVULANATE POTASSIUM 875; 125 MG/1; MG/1
1 TABLET, FILM COATED ORAL EVERY 8 HOURS
Status: DISCONTINUED | OUTPATIENT
Start: 2019-06-19 | End: 2019-06-19 | Stop reason: HOSPADM

## 2019-06-19 RX ADMIN — PIPERACILLIN SODIUM,TAZOBACTAM SODIUM 3.38 G: 3; .375 INJECTION, POWDER, FOR SOLUTION INTRAVENOUS at 06:25

## 2019-06-19 RX ADMIN — SODIUM CHLORIDE 125 ML/HR: 0.9 INJECTION, SOLUTION INTRAVENOUS at 06:26

## 2019-06-19 RX ADMIN — ENOXAPARIN SODIUM 40 MG: 40 INJECTION SUBCUTANEOUS at 10:53

## 2019-06-19 RX ADMIN — PIPERACILLIN SODIUM,TAZOBACTAM SODIUM 3.38 G: 3; .375 INJECTION, POWDER, FOR SOLUTION INTRAVENOUS at 02:00

## 2019-06-19 RX ADMIN — ACETAMINOPHEN 650 MG: 325 TABLET, FILM COATED ORAL at 10:53

## 2019-06-25 ENCOUNTER — TELEPHONE (OUTPATIENT)
Dept: INTERNAL MEDICINE CLINIC | Facility: CLINIC | Age: 54
End: 2019-06-25

## 2020-01-30 ENCOUNTER — TELEPHONE (OUTPATIENT)
Dept: INTERNAL MEDICINE CLINIC | Facility: CLINIC | Age: 55
End: 2020-01-30

## 2021-08-10 ENCOUNTER — OFFICE VISIT (OUTPATIENT)
Dept: INTERNAL MEDICINE CLINIC | Facility: CLINIC | Age: 56
End: 2021-08-10

## 2021-08-10 VITALS
OXYGEN SATURATION: 100 % | TEMPERATURE: 98.1 F | HEIGHT: 67 IN | WEIGHT: 148.6 LBS | DIASTOLIC BLOOD PRESSURE: 72 MMHG | HEART RATE: 80 BPM | BODY MASS INDEX: 23.32 KG/M2 | SYSTOLIC BLOOD PRESSURE: 118 MMHG

## 2021-08-10 DIAGNOSIS — Z00.00 HEALTHCARE MAINTENANCE: ICD-10-CM

## 2021-08-10 DIAGNOSIS — Z12.5 PROSTATE CANCER SCREENING: ICD-10-CM

## 2021-08-10 DIAGNOSIS — K57.92 DIVERTICULITIS: ICD-10-CM

## 2021-08-10 DIAGNOSIS — I10 ESSENTIAL HYPERTENSION: Primary | ICD-10-CM

## 2021-08-10 PROCEDURE — 99213 OFFICE O/P EST LOW 20 MIN: CPT | Performed by: INTERNAL MEDICINE

## 2021-08-10 RX ORDER — METRONIDAZOLE 500 MG/1
500 TABLET ORAL EVERY 8 HOURS SCHEDULED
Qty: 30 TABLET | Refills: 0 | Status: SHIPPED | OUTPATIENT
Start: 2021-08-10 | End: 2021-08-20

## 2021-08-10 RX ORDER — CIPROFLOXACIN 500 MG/1
500 TABLET, FILM COATED ORAL EVERY 12 HOURS SCHEDULED
Qty: 20 TABLET | Refills: 0 | Status: SHIPPED | OUTPATIENT
Start: 2021-08-10 | End: 2021-08-20

## 2021-08-10 RX ORDER — AMLODIPINE BESYLATE 5 MG/1
5 TABLET ORAL DAILY
COMMUNITY
Start: 2021-07-20 | End: 2021-08-10 | Stop reason: SDUPTHER

## 2021-08-10 RX ORDER — AMLODIPINE BESYLATE 5 MG/1
5 TABLET ORAL DAILY
Qty: 100 TABLET | Refills: 3 | Status: SHIPPED | OUTPATIENT
Start: 2021-08-10

## 2021-08-10 NOTE — PROGRESS NOTES
Assessment/Plan:       Diagnoses and all orders for this visit:    Essential hypertension    Healthcare maintenance    Prostate cancer screening    Other orders  -     amLODIPine (NORVASC) 5 mg tablet; Take 5 mg by mouth daily                Subjective:      Patient ID: Sarah Menon is a 54 y o  male  Recurrent diverticulitis   A 63-year-old man  Treated twice this year for diverticulitis  Treated twice in 2019 for diverticulitis  All of these episodes documented diverticulitis without perforation  He should get his left hemicolectomy but he has no health insurance  Hypertensive treated with amlodipine      CT abdomen and pelvis acute diverticulitis without perforation and without any sign of malignancy July 21st   Laboratory testing July 21st CBC CMP normal     Electrocardiogram July 21st borderline LVH versus normal variant  Certainly no acute changes  Employed as a contractor by [employer]     No cigarettes, no alcohol, no illicit drugs     Lives by himself     Still has some residual crampy left lower quadrant pain with minimal minimal tenderness  He is also basis in [place] and his brother is a physician there  If he goes to [place] his treatment will be free and my advice is that he go there tomorrow  The following portions of the patient's history were reviewed and updated as appropriate:   He has a past medical history of Diverticulitis and Hypertension  ,  does not have any pertinent problems on file  ,   has a past surgical history that includes No past surgeries  ,  family history includes Anemia in his mother; Asthma in his mother; COPD in his mother; Diverticulitis in his sister; Hypertension in his father; Lung cancer in his father  ,   reports that he has never smoked  He has never used smokeless tobacco  He reports previous alcohol use  He reports that he does not use drugs  ,  has No Known Allergies     Current Outpatient Medications Medication Sig Dispense Refill    amLODIPine (NORVASC) 5 mg tablet Take 5 mg by mouth daily       No current facility-administered medications for this visit  Review of Systems   Gastrointestinal: Positive for abdominal pain  Musculoskeletal: Positive for arthralgias  All other systems reviewed and are negative  Objective:  Vitals:    08/10/21 0753   BP: 118/72   Pulse: 80   Temp: 98 1 °F (36 7 °C)   SpO2: 100%      Physical Exam  Constitutional:       Comments:  A thin male who appears to be the stated age   Cardiovascular:      Rate and Rhythm: Normal rate  Pulmonary:      Effort: Pulmonary effort is normal    Abdominal:      General: Bowel sounds are normal  There is no distension  Tenderness: There is abdominal tenderness  There is no guarding or rebound  Musculoskeletal:      Cervical back: Normal range of motion  Neurological:      General: No focal deficit present  Psychiatric:         Mood and Affect: Mood normal            There are no Patient Instructions on file for this visit

## 2021-08-10 NOTE — PATIENT INSTRUCTIONS
Recurrent diverticulitis  Surgical consult needed  Since he is employed by [employer] go there    However, the best plan is to go to [place] where the treatment will be  free

## 2022-01-07 ENCOUNTER — TELEPHONE (OUTPATIENT)
Dept: INTERNAL MEDICINE CLINIC | Facility: CLINIC | Age: 57
End: 2022-01-07

## 2022-01-08 ENCOUNTER — OFFICE VISIT (OUTPATIENT)
Dept: INTERNAL MEDICINE CLINIC | Facility: CLINIC | Age: 57
End: 2022-01-08
Payer: COMMERCIAL

## 2022-01-08 ENCOUNTER — APPOINTMENT (OUTPATIENT)
Dept: LAB | Facility: CLINIC | Age: 57
End: 2022-01-08
Payer: COMMERCIAL

## 2022-01-08 VITALS
BODY MASS INDEX: 24.96 KG/M2 | SYSTOLIC BLOOD PRESSURE: 100 MMHG | HEART RATE: 65 BPM | HEIGHT: 67 IN | RESPIRATION RATE: 16 BRPM | OXYGEN SATURATION: 99 % | WEIGHT: 159 LBS | TEMPERATURE: 96.6 F | DIASTOLIC BLOOD PRESSURE: 66 MMHG

## 2022-01-08 DIAGNOSIS — R19.4 CHANGE IN BOWEL HABIT: ICD-10-CM

## 2022-01-08 DIAGNOSIS — R31.0 GROSS HEMATURIA: Primary | ICD-10-CM

## 2022-01-08 DIAGNOSIS — R31.0 GROSS HEMATURIA: ICD-10-CM

## 2022-01-08 DIAGNOSIS — R10.9 LEFT FLANK PAIN: ICD-10-CM

## 2022-01-08 DIAGNOSIS — Z00.00 HEALTHCARE MAINTENANCE: ICD-10-CM

## 2022-01-08 LAB
ALBUMIN SERPL BCP-MCNC: 3.7 G/DL (ref 3.5–5)
ALP SERPL-CCNC: 48 U/L (ref 46–116)
ALT SERPL W P-5'-P-CCNC: 22 U/L (ref 12–78)
ANION GAP SERPL CALCULATED.3IONS-SCNC: 5 MMOL/L (ref 4–13)
AST SERPL W P-5'-P-CCNC: 26 U/L (ref 5–45)
BASOPHILS # BLD AUTO: 0.05 THOUSANDS/ΜL (ref 0–0.1)
BASOPHILS NFR BLD AUTO: 1 % (ref 0–1)
BILIRUB SERPL-MCNC: 1.17 MG/DL (ref 0.2–1)
BUN SERPL-MCNC: 14 MG/DL (ref 5–25)
CALCIUM SERPL-MCNC: 9.8 MG/DL (ref 8.3–10.1)
CHLORIDE SERPL-SCNC: 107 MMOL/L (ref 100–108)
CHOLEST SERPL-MCNC: 156 MG/DL
CO2 SERPL-SCNC: 28 MMOL/L (ref 21–32)
CREAT SERPL-MCNC: 1.07 MG/DL (ref 0.6–1.3)
EOSINOPHIL # BLD AUTO: 0.43 THOUSAND/ΜL (ref 0–0.61)
EOSINOPHIL NFR BLD AUTO: 6 % (ref 0–6)
ERYTHROCYTE [DISTWIDTH] IN BLOOD BY AUTOMATED COUNT: 12.1 % (ref 11.6–15.1)
GFR SERPL CREATININE-BSD FRML MDRD: 77 ML/MIN/1.73SQ M
GLUCOSE P FAST SERPL-MCNC: 99 MG/DL (ref 65–99)
HCT VFR BLD AUTO: 42.8 % (ref 36.5–49.3)
HCV AB SER QL: NORMAL
HDLC SERPL-MCNC: 39 MG/DL
HGB BLD-MCNC: 14.1 G/DL (ref 12–17)
IMM GRANULOCYTES # BLD AUTO: 0.03 THOUSAND/UL (ref 0–0.2)
IMM GRANULOCYTES NFR BLD AUTO: 0 % (ref 0–2)
LDLC SERPL CALC-MCNC: 103 MG/DL (ref 0–100)
LYMPHOCYTES # BLD AUTO: 1.94 THOUSANDS/ΜL (ref 0.6–4.47)
LYMPHOCYTES NFR BLD AUTO: 28 % (ref 14–44)
MCH RBC QN AUTO: 30.9 PG (ref 26.8–34.3)
MCHC RBC AUTO-ENTMCNC: 32.9 G/DL (ref 31.4–37.4)
MCV RBC AUTO: 94 FL (ref 82–98)
MONOCYTES # BLD AUTO: 0.64 THOUSAND/ΜL (ref 0.17–1.22)
MONOCYTES NFR BLD AUTO: 9 % (ref 4–12)
NEUTROPHILS # BLD AUTO: 3.95 THOUSANDS/ΜL (ref 1.85–7.62)
NEUTS SEG NFR BLD AUTO: 56 % (ref 43–75)
NRBC BLD AUTO-RTO: 0 /100 WBCS
PLATELET # BLD AUTO: 214 THOUSANDS/UL (ref 149–390)
PMV BLD AUTO: 10.7 FL (ref 8.9–12.7)
POTASSIUM SERPL-SCNC: 3.7 MMOL/L (ref 3.5–5.3)
PROT SERPL-MCNC: 8.5 G/DL (ref 6.4–8.2)
PSA SERPL-MCNC: 2.8 NG/ML (ref 0–4)
RBC # BLD AUTO: 4.56 MILLION/UL (ref 3.88–5.62)
SODIUM SERPL-SCNC: 140 MMOL/L (ref 136–145)
TRIGL SERPL-MCNC: 72 MG/DL
TSH SERPL DL<=0.05 MIU/L-ACNC: 1.17 UIU/ML (ref 0.36–3.74)
WBC # BLD AUTO: 7.04 THOUSAND/UL (ref 4.31–10.16)

## 2022-01-08 PROCEDURE — 3078F DIAST BP <80 MM HG: CPT | Performed by: INTERNAL MEDICINE

## 2022-01-08 PROCEDURE — 84153 ASSAY OF PSA TOTAL: CPT

## 2022-01-08 PROCEDURE — 87389 HIV-1 AG W/HIV-1&-2 AB AG IA: CPT

## 2022-01-08 PROCEDURE — 80061 LIPID PANEL: CPT

## 2022-01-08 PROCEDURE — 36415 COLL VENOUS BLD VENIPUNCTURE: CPT

## 2022-01-08 PROCEDURE — 99214 OFFICE O/P EST MOD 30 MIN: CPT | Performed by: INTERNAL MEDICINE

## 2022-01-08 PROCEDURE — 84443 ASSAY THYROID STIM HORMONE: CPT

## 2022-01-08 PROCEDURE — 86803 HEPATITIS C AB TEST: CPT

## 2022-01-08 PROCEDURE — 85025 COMPLETE CBC W/AUTO DIFF WBC: CPT

## 2022-01-08 PROCEDURE — 80053 COMPREHEN METABOLIC PANEL: CPT

## 2022-01-08 PROCEDURE — 3074F SYST BP LT 130 MM HG: CPT | Performed by: INTERNAL MEDICINE

## 2022-01-08 NOTE — PROGRESS NOTES
Assessment/Plan:       Diagnoses and all orders for this visit:    Gross hematuria  -     PSA Total, Diagnostic; Future  -     CT abdomen pelvis w contrast; Future    Left flank pain  -     CT abdomen pelvis w contrast; Future    Change in bowel habit  -     CT abdomen pelvis w contrast; Future                Subjective:      Patient ID: Aleks June is a 64 y o  male  59-year-old man with an unusual symptom   He describes intermittent ongoing almost daily left flank pain waxing and waning but not to severe for about 3 weeks  He describes occasional episodes of urinating blood   He describes what he describes as passing gas through his penis! All of these associated symptoms again in 3 weeks duration   No diarrhea or constipation but he does describe passing "white material "through the bowel movement  No fever no chills but he does describe night sweats     On exam he is not toxic in appearance at all  His abdominal exam is  normal       The following portions of the patient's history were reviewed and updated as appropriate:   He has a past medical history of Diverticulitis and Hypertension  ,  does not have any pertinent problems on file  ,   has a past surgical history that includes No past surgeries  ,  family history includes Anemia in his mother; Asthma in his mother; COPD in his mother; Diverticulitis in his sister; Hypertension in his father; Lung cancer in his father  ,   reports that he has never smoked  He has never used smokeless tobacco  He reports previous alcohol use  He reports that he does not use drugs  ,  has No Known Allergies     Current Outpatient Medications   Medication Sig Dispense Refill    amLODIPine (NORVASC) 5 mg tablet Take 1 tablet (5 mg total) by mouth daily 100 tablet 3     No current facility-administered medications for this visit  Review of Systems   Gastrointestinal: Positive for abdominal pain            Passage of "white material "per rectum occasionally Genitourinary: Positive for difficulty urinating, flank pain and hematuria  Objective:  Vitals:    01/08/22 0803   BP: 100/66   Pulse: 65   Resp: 16   Temp: (!) 96 6 °F (35 9 °C)   SpO2: 99%      Physical Exam  Constitutional:       Appearance: He is well-developed  HENT:      Head: Normocephalic and atraumatic  Eyes:      Conjunctiva/sclera: Conjunctivae normal       Pupils: Pupils are equal, round, and reactive to light  Neck:      Thyroid: No thyromegaly  Cardiovascular:      Rate and Rhythm: Normal rate and regular rhythm  Heart sounds: Normal heart sounds  No murmur heard  Pulmonary:      Effort: Pulmonary effort is normal  No respiratory distress  Breath sounds: No wheezing or rales  Abdominal:      Palpations: Abdomen is soft  Tenderness: There is no abdominal tenderness  Genitourinary:     Penis: Normal        Testes:         Right: Mass or tenderness not present  Left: Mass or tenderness not present  Musculoskeletal:         General: No deformity  Normal range of motion  Cervical back: Normal range of motion  Lymphadenopathy:      Cervical: No cervical adenopathy  Skin:     General: Skin is warm and dry  Neurological:      Mental Status: He is alert and oriented to person, place, and time  Psychiatric:         Behavior: Behavior normal          Thought Content: Thought content normal          Judgment: Judgment normal            Patient Instructions    A patient with the above complaint x3 weeks with a normal examination   Needs a SE[facility] laboratory testing and CT of abdomen plus pelvis  Further recommendations will depend upon results of the studies

## 2022-01-08 NOTE — PATIENT INSTRUCTIONS
A patient with the above complaint x3 weeks with a normal examination   Needs asap laboratory testing and CT of abdomen plus pelvis  Further recommendations will depend upon results of the studies

## 2022-01-09 LAB — HIV 1+2 AB+HIV1 P24 AG SERPL QL IA: NORMAL

## 2022-01-10 ENCOUNTER — TELEPHONE (OUTPATIENT)
Dept: INTERNAL MEDICINE CLINIC | Facility: CLINIC | Age: 57
End: 2022-01-10

## 2022-01-13 ENCOUNTER — HOSPITAL ENCOUNTER (OUTPATIENT)
Dept: CT IMAGING | Facility: HOSPITAL | Age: 57
Discharge: HOME | End: 2022-01-13
Payer: COMMERCIAL

## 2022-01-13 DIAGNOSIS — R10.9 LEFT FLANK PAIN: ICD-10-CM

## 2022-01-13 DIAGNOSIS — R31.0 GROSS HEMATURIA: ICD-10-CM

## 2022-01-13 DIAGNOSIS — R19.4 CHANGE IN BOWEL HABIT: ICD-10-CM

## 2022-01-13 PROCEDURE — 74177 CT ABD & PELVIS W/CONTRAST: CPT

## 2022-01-13 PROCEDURE — G1004 CDSM NDSC: HCPCS

## 2022-01-13 RX ADMIN — IOHEXOL 100 ML: 350 INJECTION, SOLUTION INTRAVENOUS at 07:47

## 2022-01-13 NOTE — H&P (VIEW-ONLY)
Colon and Rectal Surgery   Blade Fontana 64 y o  male MRN [id number]  Encounter: [id number]  01/14/22 8:52 AM            Assessment: Blade Fontana is a 64 y o  male who has a diverticular colovesical fistula  Plan:   Diverticulitis  He has had recurrent diverticulitis over the last 7 years  He has had multiple episodes and CT scans have shown severe sigmoid thickening  Approximately 3 weeks ago, he began to notice pneumaturia and fecaluria  He has mild lower abdominal discomfort as well  A CT confirms gas in the bladder and thickening of the dome of the bladder with surrounding sigmoid colon thickening and diverticulitis  He has been placed on Bactrim for treatment by his primary care doctor, Dr Joann Agudelo  I recommend surgical treatment with sigmoid resection and takedown of colovesical fistula  Primary anastomosis is intended  This should be done urgently but not emergently  He understands the potential risk if surgery is not performed, specifically urinary infections and risk to kidney function  Risks of surgery, including but not limited to bleeding, pain, infection, hernia formation, injury to the ureter or other internal organs requiring surgery specific to these injuries, anastomotic leak, impotence, deep vein thrombosis, pulmonary embolism, myocardial infarction or heart failure, stroke, death, need for and enterostomy, or other unspecified complications were discussed  Benefits and alternatives were discussed  Questions were answered  He agrees to the operation  My intention is to try to perform this laparoscopically  Open surgery will be used as indicated  Urological stents will be requested before surgery  Subjective     HPI    Blade Fontana is a 64 y o  male who is here today for evaluation of colovesical fistula  He reports he has noticed stool with urination since December  The CT scan on 1/13/22 showed:   1  Acute to subacute sigmoid diverticulitis  2  Abnormality between the sigmoid colon and bladder dome as described consistent with small abscess/fistula  3  Intraluminal bladder air, given history and imaging findings, this is highly concerning for presence of colovesicular fistula    His most recent colonoscopy was on 6/27/16 with Dr Leia Lara which revealed mild diverticulosis  Recommended 10 year recall     He denies a family history of colorectal cancer  Historical Information   Past Medical History:   Diagnosis Date    Diverticulitis     Hypertension      Past Surgical History:   Procedure Laterality Date    NO PAST SURGERIES         Meds/Allergies       Current Outpatient Medications:     amLODIPine (NORVASC) 5 mg tablet, Take 1 tablet (5 mg total) by mouth daily, Disp: 100 tablet, Rfl: 3    sulfamethoxazole-trimethoprim (BACTRIM DS) 800-160 mg per tablet, Take 1 tablet by mouth every 12 (twelve) hours for 7 days, Disp: 14 tablet, Rfl: 0  No Known Allergies    Social History   Social History     Substance and Sexual Activity   Drug Use No     Social History     Tobacco Use   Smoking Status Never Smoker   Smokeless Tobacco Never Used         Family History   Problem Relation Age of Onset    Asthma Mother     Anemia Mother    [facility] COPD Mother     Lung cancer Father     Hypertension Father     Diverticulitis Sister     Colon cancer Neg Hx          Review of Systems   Constitutional: Negative  Respiratory: Negative  Cardiovascular: Negative  Gastrointestinal: Positive for abdominal pain  Negative for abdominal distention, anal bleeding, blood in stool, constipation and diarrhea  Genitourinary:        Fecaluria, pneumaturia       Objective   Current Vitals:  Vitals:    01/14/22 0819   Weight: 73 kg (161 lb)   Height: 5' 7" (1 702 m)         Physical Exam  Constitutional:       Appearance: Normal appearance  Eyes:      Conjunctiva/sclera: Conjunctivae normal    Cardiovascular:      Rate and Rhythm: Normal rate and regular rhythm  Pulmonary:      Effort: Pulmonary effort is normal       Breath sounds: Normal breath sounds  Abdominal:      General: Abdomen is flat  Palpations: Abdomen is soft  Neurological:      General: No focal deficit present  Mental Status: He is alert and oriented to person, place, and time     Psychiatric:         Mood and Affect: Mood normal          Behavior: Behavior normal

## 2022-01-19 ENCOUNTER — TELEPHONE (OUTPATIENT)
Dept: INTERNAL MEDICINE CLINIC | Facility: CLINIC | Age: 57
End: 2022-01-19

## 2022-01-19 NOTE — TELEPHONE ENCOUNTER
Pt is having surgery colon perforation surgery being performed at [facility] with dr Moriah Chand    Needs medication for uti until his surgery which is scheduled 02/01/22    Sent to [facility] mauricio spence    Pt has an appt with dr Isaias Perez 01/22/22  Does he still need this appt?   Please advise, call back

## 2022-01-20 NOTE — TELEPHONE ENCOUNTER
If they want a surgical clearance then he needs the appointment  I sent in a refill on his Bactrim to the pharmacy 2 days ago  Just make sure he has it

## 2022-01-22 ENCOUNTER — LAB REQUISITION (OUTPATIENT)
Dept: LAB | Facility: HOSPITAL | Age: 57
End: 2022-01-22
Payer: COMMERCIAL

## 2022-01-22 ENCOUNTER — APPOINTMENT (OUTPATIENT)
Dept: LAB | Facility: CLINIC | Age: 57
End: 2022-01-22
Payer: COMMERCIAL

## 2022-01-22 ENCOUNTER — OFFICE VISIT (OUTPATIENT)
Dept: INTERNAL MEDICINE CLINIC | Facility: CLINIC | Age: 57
End: 2022-01-22
Payer: COMMERCIAL

## 2022-01-22 VITALS
SYSTOLIC BLOOD PRESSURE: 128 MMHG | HEIGHT: 67 IN | DIASTOLIC BLOOD PRESSURE: 86 MMHG | OXYGEN SATURATION: 99 % | RESPIRATION RATE: 16 BRPM | TEMPERATURE: 96.2 F | WEIGHT: 161.6 LBS | BODY MASS INDEX: 25.36 KG/M2 | HEART RATE: 62 BPM

## 2022-01-22 DIAGNOSIS — R82.81 PYURIA: ICD-10-CM

## 2022-01-22 DIAGNOSIS — R31.0 GROSS HEMATURIA: ICD-10-CM

## 2022-01-22 DIAGNOSIS — I10 ESSENTIAL HYPERTENSION: ICD-10-CM

## 2022-01-22 DIAGNOSIS — K57.20 DIVERTICULITIS OF COLON WITH PERFORATION: Primary | ICD-10-CM

## 2022-01-22 DIAGNOSIS — K57.92 DIVERTICULITIS: ICD-10-CM

## 2022-01-22 DIAGNOSIS — K57.92 DIVERTICULITIS OF INTESTINE, PART UNSPECIFIED, WITHOUT PERFORATION OR ABSCESS WITHOUT BLEEDING: ICD-10-CM

## 2022-01-22 DIAGNOSIS — R10.9 LEFT FLANK PAIN: ICD-10-CM

## 2022-01-22 LAB
ABO GROUP BLD: NORMAL
ALBUMIN SERPL BCP-MCNC: 3.8 G/DL (ref 3.5–5)
ALP SERPL-CCNC: 50 U/L (ref 46–116)
ALT SERPL W P-5'-P-CCNC: 26 U/L (ref 12–78)
ANION GAP SERPL CALCULATED.3IONS-SCNC: 3 MMOL/L (ref 4–13)
AST SERPL W P-5'-P-CCNC: 23 U/L (ref 5–45)
BASOPHILS # BLD AUTO: 0.07 THOUSANDS/ΜL (ref 0–0.1)
BASOPHILS NFR BLD AUTO: 1 % (ref 0–1)
BILIRUB SERPL-MCNC: 0.74 MG/DL (ref 0.2–1)
BLD GP AB SCN SERPL QL: NEGATIVE
BUN SERPL-MCNC: 11 MG/DL (ref 5–25)
CALCIUM SERPL-MCNC: 9.5 MG/DL (ref 8.3–10.1)
CHLORIDE SERPL-SCNC: 105 MMOL/L (ref 100–108)
CO2 SERPL-SCNC: 29 MMOL/L (ref 21–32)
CREAT SERPL-MCNC: 1.03 MG/DL (ref 0.6–1.3)
EOSINOPHIL # BLD AUTO: 0.42 THOUSAND/ΜL (ref 0–0.61)
EOSINOPHIL NFR BLD AUTO: 5 % (ref 0–6)
ERYTHROCYTE [DISTWIDTH] IN BLOOD BY AUTOMATED COUNT: 12.2 % (ref 11.6–15.1)
EST. AVERAGE GLUCOSE BLD GHB EST-MCNC: 120 MG/DL
GFR SERPL CREATININE-BSD FRML MDRD: 80 ML/MIN/1.73SQ M
GLUCOSE P FAST SERPL-MCNC: 90 MG/DL (ref 65–99)
HBA1C MFR BLD: 5.8 %
HCT VFR BLD AUTO: 45.4 % (ref 36.5–49.3)
HGB BLD-MCNC: 14.3 G/DL (ref 12–17)
IMM GRANULOCYTES # BLD AUTO: 0.03 THOUSAND/UL (ref 0–0.2)
IMM GRANULOCYTES NFR BLD AUTO: 0 % (ref 0–2)
LYMPHOCYTES # BLD AUTO: 2.4 THOUSANDS/ΜL (ref 0.6–4.47)
LYMPHOCYTES NFR BLD AUTO: 27 % (ref 14–44)
MCH RBC QN AUTO: 30.2 PG (ref 26.8–34.3)
MCHC RBC AUTO-ENTMCNC: 31.5 G/DL (ref 31.4–37.4)
MCV RBC AUTO: 96 FL (ref 82–98)
MONOCYTES # BLD AUTO: 0.54 THOUSAND/ΜL (ref 0.17–1.22)
MONOCYTES NFR BLD AUTO: 6 % (ref 4–12)
NEUTROPHILS # BLD AUTO: 5.59 THOUSANDS/ΜL (ref 1.85–7.62)
NEUTS SEG NFR BLD AUTO: 61 % (ref 43–75)
NRBC BLD AUTO-RTO: 0 /100 WBCS
PLATELET # BLD AUTO: 196 THOUSANDS/UL (ref 149–390)
PMV BLD AUTO: 10.5 FL (ref 8.9–12.7)
POTASSIUM SERPL-SCNC: 4.3 MMOL/L (ref 3.5–5.3)
PROT SERPL-MCNC: 8.5 G/DL (ref 6.4–8.2)
RBC # BLD AUTO: 4.74 MILLION/UL (ref 3.88–5.62)
RH BLD: NEGATIVE
SODIUM SERPL-SCNC: 137 MMOL/L (ref 136–145)
SPECIMEN EXPIRATION DATE: NORMAL
WBC # BLD AUTO: 9.05 THOUSAND/UL (ref 4.31–10.16)

## 2022-01-22 PROCEDURE — 86901 BLOOD TYPING SEROLOGIC RH(D): CPT | Performed by: COLON & RECTAL SURGERY

## 2022-01-22 PROCEDURE — 99214 OFFICE O/P EST MOD 30 MIN: CPT | Performed by: INTERNAL MEDICINE

## 2022-01-22 PROCEDURE — 85025 COMPLETE CBC W/AUTO DIFF WBC: CPT

## 2022-01-22 PROCEDURE — 80053 COMPREHEN METABOLIC PANEL: CPT

## 2022-01-22 PROCEDURE — 86850 RBC ANTIBODY SCREEN: CPT | Performed by: COLON & RECTAL SURGERY

## 2022-01-22 PROCEDURE — 83036 HEMOGLOBIN GLYCOSYLATED A1C: CPT

## 2022-01-22 PROCEDURE — 1036F TOBACCO NON-USER: CPT | Performed by: INTERNAL MEDICINE

## 2022-01-22 PROCEDURE — 36415 COLL VENOUS BLD VENIPUNCTURE: CPT

## 2022-01-22 PROCEDURE — 93000 ELECTROCARDIOGRAM COMPLETE: CPT | Performed by: INTERNAL MEDICINE

## 2022-01-22 PROCEDURE — 3008F BODY MASS INDEX DOCD: CPT | Performed by: INTERNAL MEDICINE

## 2022-01-22 PROCEDURE — 86900 BLOOD TYPING SEROLOGIC ABO: CPT | Performed by: COLON & RECTAL SURGERY

## 2022-01-22 PROCEDURE — 87086 URINE CULTURE/COLONY COUNT: CPT

## 2022-01-22 RX ORDER — SULFAMETHOXAZOLE AND TRIMETHOPRIM 800; 160 MG/1; MG/1
1 TABLET ORAL 2 TIMES DAILY
Qty: 20 TABLET | Refills: 0 | Status: SHIPPED | OUTPATIENT
Start: 2022-01-22 | End: 2022-01-25

## 2022-01-22 NOTE — PROGRESS NOTES
Assessment/Plan:       Diagnoses and all orders for this visit:    Diverticulitis of colon with perforation  -     sulfamethoxazole-trimethoprim (BACTRIM DS) 800-160 mg per tablet; Take 1 tablet by mouth 2 (two) times a day for 3 days    Essential hypertension  -     POCT ECG                Subjective:      Patient ID: Frank Murry is a 64 y o  male  Acute diverticulitis perforated into the bladder  I had seen this patient a couple of weeks ago  He had an unusual complaint; he had a complaint of feeling pain in the left flank and also noting urinating blood and urinating guess  He did not have left lower quadrant pain  Nevertheless, CT scanning documented the presence of a diverticular infection with fistula into the bladder  He was not all toxic and his exam was totally benign  Subsequently seen by Colorectal surgery  There care plan is colonoscopy followed by corrective surgery  He feels fine with the continued exception of passage of gas through his penis! No chest pain and no trouble breathing   Normal exam   Normal EKG      The following portions of the patient's history were reviewed and updated as appropriate:   He has a past medical history of Diverticulitis and Hypertension  ,  does not have any pertinent problems on file  ,   has a past surgical history that includes No past surgeries  ,  family history includes Anemia in his mother; Asthma in his mother; COPD in his mother; Diverticulitis in his sister; Hypertension in his father; Lung cancer in his father  ,   reports that he has never smoked  He has never used smokeless tobacco  He reports previous alcohol use  He reports that he does not use drugs  ,  has No Known Allergies     Current Outpatient Medications   Medication Sig Dispense Refill    amLODIPine (NORVASC) 5 mg tablet Take 1 tablet (5 mg total) by mouth daily 100 tablet 3    [START ON 1/31/2022] metroNIDAZOLE (FLAGYL) 500 mg tablet Take 1 tablet at 1:00pm and 1 tablet at 10:00pm the day prior to surgery  2 tablet 0    [START ON 1/31/2022] neomycin (MYCIFRADIN) 500 mg tablet Take 2 tablets at 1:00pm and 2 tablets at 10:00pm the day prior to surgery  4 tablet 0    sulfamethoxazole-trimethoprim (BACTRIM DS) 800-160 mg per tablet Take 1 tablet by mouth 2 (two) times a day for 3 days 20 tablet 0     No current facility-administered medications for this visit  Review of Systems   Constitutional: Negative for chills and fever  HENT: Negative for sore throat and trouble swallowing  Eyes: Negative for pain  Respiratory: Negative for cough and shortness of breath  Cardiovascular: Negative for chest pain and palpitations  Gastrointestinal: Negative for abdominal pain, blood in stool, diarrhea, nausea and vomiting  Endocrine: Negative for cold intolerance and heat intolerance  Genitourinary: Negative for dysuria, frequency and testicular pain  Passage of gas per his penis occasionally   Musculoskeletal: Negative for joint swelling  Allergic/Immunologic: Negative for immunocompromised state  Neurological: Negative for dizziness, syncope and headaches  Hematological: Negative for adenopathy  Does not bruise/bleed easily  Psychiatric/Behavioral: Negative for dysphoric mood  The patient is not nervous/anxious  Objective:  Vitals:    01/22/22 1016   BP: 128/86   Pulse: 62   Resp: 16   Temp: (!) 96 2 °F (35 7 °C)   SpO2: 99%      Physical Exam  Constitutional:       Appearance: He is well-developed  HENT:      Head: Normocephalic and atraumatic  Eyes:      Pupils: Pupils are equal, round, and reactive to light  Neck:      Thyroid: No thyromegaly  Trachea: No tracheal deviation  Cardiovascular:      Rate and Rhythm: Normal rate and regular rhythm  Heart sounds: Normal heart sounds  No murmur heard  No gallop  Pulmonary:      Effort: Pulmonary effort is normal  No respiratory distress  Breath sounds: No wheezing or rales  Abdominal:      General: Bowel sounds are normal       Palpations: Abdomen is soft  Tenderness: There is no abdominal tenderness  Musculoskeletal:         General: No tenderness or deformity  Normal range of motion  Cervical back: Normal range of motion and neck supple  Skin:     General: Skin is warm and dry  Neurological:      Mental Status: He is alert and oriented to person, place, and time  Coordination: Coordination normal       Deep Tendon Reflexes: Reflexes are normal and symmetric  Patient Instructions    A patient with the above history and physical  He is considered optimized for the proposed procedures including colonoscopy and surgery  He does not need any kind of pre-surgical provocative testing  There is no indication for perioperative beta-blockade    Colonoscopy and surgery should proceed as planned   continue Bactrim in the pre-surgical   For prevention of urinary tract infection

## 2022-01-22 NOTE — PATIENT INSTRUCTIONS
A patient with the above history and physical  He is considered optimized for the proposed procedures including colonoscopy and surgery  He does not need any kind of pre-surgical provocative testing  There is no indication for perioperative beta-blockade    Colonoscopy and surgery should proceed as planned   continue Bactrim in the pre-surgical   For prevention of urinary tract infection

## 2022-01-23 LAB — BACTERIA UR CULT: NORMAL

## 2022-01-25 NOTE — PRE-PROCEDURE INSTRUCTIONS
Pre-Surgery Instructions:   Medication Instructions    amLODIPine (NORVASC) 5 mg tablet WILL TAKE DOS SM SIP    [START ON 1/31/2022] metroNIDAZOLE (FLAGYL) 500 mg tablet DAY BEFORE SURG    [START ON 1/31/2022] neomycin (MYCIFRADIN) 500 mg tablet DAY BEFORE SURG    sulfamethoxazole-trimethoprim (BACTRIM DS) 800-160 mg per tablet WILL TAKE DOS SM SIP    INSTR  ON ADMIT LOC ,BRING PHOTO ID/MED LIST/INS  INFO , SHOWER REV , NO ASA/NSAIDS/VIT 1 WEEK PREOP  Pre Procedure Consult Calls    1  Are you currently experiencing symptoms of fever >100 4, cough, or shortness of breath or sore throat? ______YES    ____X_NO   If yes, then please call your PCP immediately for further direction  If you are completing this form on site, please find the safest and most direct route to the nearest exit and avoid close contact with others until you can get further advice from your PCP  2  Have you recently traveled to any foreign country or area within the [place] that has reported cases of COVID-19? ______YES      ____X_NO   IF YES, LIST LOCATION(S): __________________________   3  Have you recently been in contact with someone who is a suspected or confirmed case of COVID-19?   ______ YES   _____X_ No   INSTRUCTED ON NEW COVID VISITOR POLICY- ONLY 1 VISITOR, ALL MUST WEAR MASKS, PT VERBALIZES UNDERSTANDING

## 2022-01-31 ENCOUNTER — ANESTHESIA EVENT (OUTPATIENT)
Dept: PERIOP | Facility: HOSPITAL | Age: 57
DRG: 330 | End: 2022-01-31
Payer: COMMERCIAL

## 2022-02-01 ENCOUNTER — ANESTHESIA (OUTPATIENT)
Dept: PERIOP | Facility: HOSPITAL | Age: 57
DRG: 330 | End: 2022-02-01
Payer: COMMERCIAL

## 2022-02-01 ENCOUNTER — HOSPITAL ENCOUNTER (INPATIENT)
Facility: HOSPITAL | Age: 57
LOS: 3 days | Discharge: HOME HEALTH | DRG: 330 | End: 2022-02-04
Attending: UROLOGY | Admitting: COLON & RECTAL SURGERY
Payer: COMMERCIAL

## 2022-02-01 DIAGNOSIS — K57.20 DIVERTICULITIS OF COLON WITH PERFORATION: Primary | ICD-10-CM

## 2022-02-01 DIAGNOSIS — K57.92 DIVERTICULITIS: ICD-10-CM

## 2022-02-01 PROBLEM — N32.1 COLOVESICAL FISTULA: Status: ACTIVE | Noted: 2022-02-01

## 2022-02-01 LAB
ABO GROUP BLD: NORMAL
GLUCOSE SERPL-MCNC: 142 MG/DL (ref 65–140)
RH BLD: NEGATIVE

## 2022-02-01 PROCEDURE — 44213 LAP MOBIL SPLENIC FL ADD-ON: CPT | Performed by: COLON & RECTAL SURGERY

## 2022-02-01 PROCEDURE — C1758 CATHETER, URETERAL: HCPCS | Performed by: UROLOGY

## 2022-02-01 PROCEDURE — 0DTN0ZZ RESECTION OF SIGMOID COLON, OPEN APPROACH: ICD-10-PCS | Performed by: COLON & RECTAL SURGERY

## 2022-02-01 PROCEDURE — 44204 LAPARO PARTIAL COLECTOMY: CPT | Performed by: COLON & RECTAL SURGERY

## 2022-02-01 PROCEDURE — 88307 TISSUE EXAM BY PATHOLOGIST: CPT | Performed by: PATHOLOGY

## 2022-02-01 PROCEDURE — 0DJD8ZZ INSPECTION OF LOWER INTESTINAL TRACT, VIA NATURAL OR ARTIFICIAL OPENING ENDOSCOPIC: ICD-10-PCS | Performed by: COLON & RECTAL SURGERY

## 2022-02-01 PROCEDURE — 0TBB0ZZ EXCISION OF BLADDER, OPEN APPROACH: ICD-10-PCS | Performed by: COLON & RECTAL SURGERY

## 2022-02-01 PROCEDURE — C1769 GUIDE WIRE: HCPCS | Performed by: UROLOGY

## 2022-02-01 PROCEDURE — 45330 DIAGNOSTIC SIGMOIDOSCOPY: CPT | Performed by: COLON & RECTAL SURGERY

## 2022-02-01 PROCEDURE — NC001 PR NO CHARGE: Performed by: UROLOGY

## 2022-02-01 PROCEDURE — 0T788DZ DILATION OF BILATERAL URETERS WITH INTRALUMINAL DEVICE, VIA NATURAL OR ARTIFICIAL OPENING ENDOSCOPIC: ICD-10-PCS | Performed by: UROLOGY

## 2022-02-01 PROCEDURE — 44661 REPAIR BOWEL-BLADDER FISTULA: CPT | Performed by: COLON & RECTAL SURGERY

## 2022-02-01 PROCEDURE — 82948 REAGENT STRIP/BLOOD GLUCOSE: CPT

## 2022-02-01 PROCEDURE — 52005 CYSTO W/URTRL CATHJ: CPT | Performed by: UROLOGY

## 2022-02-01 RX ORDER — MAGNESIUM HYDROXIDE 1200 MG/15ML
LIQUID ORAL AS NEEDED
Status: DISCONTINUED | OUTPATIENT
Start: 2022-02-01 | End: 2022-02-01 | Stop reason: HOSPADM

## 2022-02-01 RX ORDER — SODIUM CHLORIDE, SODIUM LACTATE, POTASSIUM CHLORIDE, CALCIUM CHLORIDE 600; 310; 30; 20 MG/100ML; MG/100ML; MG/100ML; MG/100ML
100 INJECTION, SOLUTION INTRAVENOUS CONTINUOUS
Status: DISCONTINUED | OUTPATIENT
Start: 2022-02-01 | End: 2022-02-01

## 2022-02-01 RX ORDER — ROCURONIUM BROMIDE 10 MG/ML
INJECTION, SOLUTION INTRAVENOUS AS NEEDED
Status: DISCONTINUED | OUTPATIENT
Start: 2022-02-01 | End: 2022-02-01

## 2022-02-01 RX ORDER — ONDANSETRON 2 MG/ML
4 INJECTION INTRAMUSCULAR; INTRAVENOUS EVERY 6 HOURS PRN
Status: DISCONTINUED | OUTPATIENT
Start: 2022-02-01 | End: 2022-02-04 | Stop reason: HOSPADM

## 2022-02-01 RX ORDER — SODIUM CHLORIDE 9 MG/ML
INJECTION, SOLUTION INTRAVENOUS CONTINUOUS PRN
Status: DISCONTINUED | OUTPATIENT
Start: 2022-02-01 | End: 2022-02-01

## 2022-02-01 RX ORDER — FENTANYL CITRATE/PF 50 MCG/ML
50 SYRINGE (ML) INJECTION
Status: DISCONTINUED | OUTPATIENT
Start: 2022-02-01 | End: 2022-02-01 | Stop reason: HOSPADM

## 2022-02-01 RX ORDER — PROPOFOL 10 MG/ML
INJECTION, EMULSION INTRAVENOUS AS NEEDED
Status: DISCONTINUED | OUTPATIENT
Start: 2022-02-01 | End: 2022-02-01

## 2022-02-01 RX ORDER — HYDROMORPHONE HCL/PF 1 MG/ML
0.4 SYRINGE (ML) INJECTION
Status: DISCONTINUED | OUTPATIENT
Start: 2022-02-01 | End: 2022-02-01 | Stop reason: HOSPADM

## 2022-02-01 RX ORDER — MIDAZOLAM HYDROCHLORIDE 2 MG/2ML
INJECTION, SOLUTION INTRAMUSCULAR; INTRAVENOUS AS NEEDED
Status: DISCONTINUED | OUTPATIENT
Start: 2022-02-01 | End: 2022-02-01

## 2022-02-01 RX ORDER — HEPARIN SODIUM 5000 [USP'U]/ML
5000 INJECTION, SOLUTION INTRAVENOUS; SUBCUTANEOUS ONCE
Status: COMPLETED | OUTPATIENT
Start: 2022-02-01 | End: 2022-02-01

## 2022-02-01 RX ORDER — AMLODIPINE BESYLATE 5 MG/1
5 TABLET ORAL DAILY
Status: DISCONTINUED | OUTPATIENT
Start: 2022-02-01 | End: 2022-02-04 | Stop reason: HOSPADM

## 2022-02-01 RX ORDER — NEOSTIGMINE METHYLSULFATE 1 MG/ML
INJECTION INTRAVENOUS AS NEEDED
Status: DISCONTINUED | OUTPATIENT
Start: 2022-02-01 | End: 2022-02-01

## 2022-02-01 RX ORDER — SODIUM CHLORIDE, SODIUM LACTATE, POTASSIUM CHLORIDE, CALCIUM CHLORIDE 600; 310; 30; 20 MG/100ML; MG/100ML; MG/100ML; MG/100ML
INJECTION, SOLUTION INTRAVENOUS CONTINUOUS PRN
Status: DISCONTINUED | OUTPATIENT
Start: 2022-02-01 | End: 2022-02-01

## 2022-02-01 RX ORDER — SODIUM CHLORIDE, SODIUM LACTATE, POTASSIUM CHLORIDE, CALCIUM CHLORIDE 600; 310; 30; 20 MG/100ML; MG/100ML; MG/100ML; MG/100ML
125 INJECTION, SOLUTION INTRAVENOUS CONTINUOUS
Status: DISCONTINUED | OUTPATIENT
Start: 2022-02-01 | End: 2022-02-02

## 2022-02-01 RX ORDER — DEXAMETHASONE SODIUM PHOSPHATE 10 MG/ML
INJECTION, SOLUTION INTRAMUSCULAR; INTRAVENOUS AS NEEDED
Status: DISCONTINUED | OUTPATIENT
Start: 2022-02-01 | End: 2022-02-01

## 2022-02-01 RX ORDER — ACETAMINOPHEN 325 MG/1
975 TABLET ORAL EVERY 8 HOURS SCHEDULED
Status: DISCONTINUED | OUTPATIENT
Start: 2022-02-01 | End: 2022-02-04 | Stop reason: HOSPADM

## 2022-02-01 RX ORDER — FENTANYL CITRATE 50 UG/ML
INJECTION, SOLUTION INTRAMUSCULAR; INTRAVENOUS AS NEEDED
Status: DISCONTINUED | OUTPATIENT
Start: 2022-02-01 | End: 2022-02-01

## 2022-02-01 RX ORDER — NEOMYCIN SULFATE 500 MG/1
1000 TABLET ORAL 3 TIMES DAILY
Status: DISCONTINUED | OUTPATIENT
Start: 2022-02-01 | End: 2022-02-01

## 2022-02-01 RX ORDER — DEXMEDETOMIDINE HYDROCHLORIDE 100 UG/ML
INJECTION, SOLUTION INTRAVENOUS AS NEEDED
Status: DISCONTINUED | OUTPATIENT
Start: 2022-02-01 | End: 2022-02-01

## 2022-02-01 RX ORDER — HEPARIN SODIUM 5000 [USP'U]/ML
5000 INJECTION, SOLUTION INTRAVENOUS; SUBCUTANEOUS EVERY 8 HOURS SCHEDULED
Status: DISCONTINUED | OUTPATIENT
Start: 2022-02-01 | End: 2022-02-04 | Stop reason: HOSPADM

## 2022-02-01 RX ORDER — HYDROMORPHONE HCL/PF 1 MG/ML
SYRINGE (ML) INJECTION AS NEEDED
Status: DISCONTINUED | OUTPATIENT
Start: 2022-02-01 | End: 2022-02-01

## 2022-02-01 RX ORDER — ONDANSETRON 2 MG/ML
4 INJECTION INTRAMUSCULAR; INTRAVENOUS ONCE AS NEEDED
Status: DISCONTINUED | OUTPATIENT
Start: 2022-02-01 | End: 2022-02-01 | Stop reason: HOSPADM

## 2022-02-01 RX ORDER — METRONIDAZOLE 500 MG/1
1000 TABLET ORAL 3 TIMES DAILY
Status: DISCONTINUED | OUTPATIENT
Start: 2022-02-01 | End: 2022-02-01

## 2022-02-01 RX ORDER — ONDANSETRON 2 MG/ML
INJECTION INTRAMUSCULAR; INTRAVENOUS AS NEEDED
Status: DISCONTINUED | OUTPATIENT
Start: 2022-02-01 | End: 2022-02-01

## 2022-02-01 RX ORDER — GLYCOPYRROLATE 0.2 MG/ML
INJECTION INTRAMUSCULAR; INTRAVENOUS AS NEEDED
Status: DISCONTINUED | OUTPATIENT
Start: 2022-02-01 | End: 2022-02-01

## 2022-02-01 RX ORDER — KETAMINE HCL IN NACL, ISO-OSM 100MG/10ML
SYRINGE (ML) INJECTION AS NEEDED
Status: DISCONTINUED | OUTPATIENT
Start: 2022-02-01 | End: 2022-02-01

## 2022-02-01 RX ORDER — CEFAZOLIN SODIUM 1 G/3ML
INJECTION, POWDER, FOR SOLUTION INTRAMUSCULAR; INTRAVENOUS AS NEEDED
Status: DISCONTINUED | OUTPATIENT
Start: 2022-02-01 | End: 2022-02-01

## 2022-02-01 RX ADMIN — ROCURONIUM BROMIDE 10 MG: 50 INJECTION, SOLUTION INTRAVENOUS at 10:07

## 2022-02-01 RX ADMIN — HYDROMORPHONE HYDROCHLORIDE 0.5 MG: 1 INJECTION, SOLUTION INTRAMUSCULAR; INTRAVENOUS; SUBCUTANEOUS at 10:06

## 2022-02-01 RX ADMIN — HYDROMORPHONE HYDROCHLORIDE 0.5 MG: 1 INJECTION, SOLUTION INTRAMUSCULAR; INTRAVENOUS; SUBCUTANEOUS at 11:06

## 2022-02-01 RX ADMIN — ONDANSETRON 4 MG: 2 INJECTION INTRAMUSCULAR; INTRAVENOUS at 11:31

## 2022-02-01 RX ADMIN — SODIUM CHLORIDE 0.2 MCG/KG/HR: 900 INJECTION INTRAVENOUS at 07:53

## 2022-02-01 RX ADMIN — GLYCOPYRROLATE 0.1 MG: 0.2 INJECTION, SOLUTION INTRAMUSCULAR; INTRAVENOUS at 08:30

## 2022-02-01 RX ADMIN — DEXMEDETOMIDINE HCL 8 MCG: 100 INJECTION INTRAVENOUS at 10:14

## 2022-02-01 RX ADMIN — NEOSTIGMINE METHYLSULFATE 3 MG: 1 INJECTION INTRAVENOUS at 11:43

## 2022-02-01 RX ADMIN — SODIUM CHLORIDE: 0.9 INJECTION, SOLUTION INTRAVENOUS at 09:05

## 2022-02-01 RX ADMIN — PHENYLEPHRINE HYDROCHLORIDE 30 MCG/MIN: 10 INJECTION INTRAVENOUS at 07:53

## 2022-02-01 RX ADMIN — SODIUM CHLORIDE, SODIUM LACTATE, POTASSIUM CHLORIDE, AND CALCIUM CHLORIDE: .6; .31; .03; .02 INJECTION, SOLUTION INTRAVENOUS at 10:28

## 2022-02-01 RX ADMIN — SODIUM CHLORIDE: 0.9 INJECTION, SOLUTION INTRAVENOUS at 07:42

## 2022-02-01 RX ADMIN — Medication 10 MG: at 11:01

## 2022-02-01 RX ADMIN — ROCURONIUM BROMIDE 40 MG: 50 INJECTION, SOLUTION INTRAVENOUS at 07:39

## 2022-02-01 RX ADMIN — DEXAMETHASONE SODIUM PHOSPHATE 8 MG: 10 INJECTION, SOLUTION INTRAMUSCULAR; INTRAVENOUS at 08:10

## 2022-02-01 RX ADMIN — HYDROMORPHONE HYDROCHLORIDE 0.25 MG: 1 INJECTION, SOLUTION INTRAMUSCULAR; INTRAVENOUS; SUBCUTANEOUS at 08:58

## 2022-02-01 RX ADMIN — HYDROMORPHONE HYDROCHLORIDE 0.5 MG: 1 INJECTION, SOLUTION INTRAMUSCULAR; INTRAVENOUS; SUBCUTANEOUS at 09:01

## 2022-02-01 RX ADMIN — GLYCOPYRROLATE 0.4 MG: 0.2 INJECTION, SOLUTION INTRAMUSCULAR; INTRAVENOUS at 11:43

## 2022-02-01 RX ADMIN — Medication 10 MG: at 10:39

## 2022-02-01 RX ADMIN — ROCURONIUM BROMIDE 20 MG: 50 INJECTION, SOLUTION INTRAVENOUS at 09:05

## 2022-02-01 RX ADMIN — LIDOCAINE HYDROCHLORIDE 50 MG: 20 INJECTION INTRAVENOUS at 07:38

## 2022-02-01 RX ADMIN — ROCURONIUM BROMIDE 10 MG: 50 INJECTION, SOLUTION INTRAVENOUS at 08:39

## 2022-02-01 RX ADMIN — MIDAZOLAM 2 MG: 1 INJECTION INTRAMUSCULAR; INTRAVENOUS at 07:32

## 2022-02-01 RX ADMIN — DEXMEDETOMIDINE HCL 8 MCG: 100 INJECTION INTRAVENOUS at 10:13

## 2022-02-01 RX ADMIN — CEFAZOLIN 1000 MG: 1 INJECTION, POWDER, FOR SOLUTION INTRAMUSCULAR; INTRAVENOUS at 07:50

## 2022-02-01 RX ADMIN — FENTANYL CITRATE 50 MCG: 50 INJECTION INTRAMUSCULAR; INTRAVENOUS at 07:38

## 2022-02-01 RX ADMIN — FENTANYL CITRATE 50 MCG: 50 INJECTION INTRAMUSCULAR; INTRAVENOUS at 08:51

## 2022-02-01 RX ADMIN — HEPARIN SODIUM 5000 UNITS: 5000 INJECTION INTRAVENOUS; SUBCUTANEOUS at 07:30

## 2022-02-01 RX ADMIN — ACETAMINOPHEN 975 MG: 325 TABLET, FILM COATED ORAL at 21:47

## 2022-02-01 RX ADMIN — SODIUM CHLORIDE, SODIUM LACTATE, POTASSIUM CHLORIDE, AND CALCIUM CHLORIDE 125 ML/HR: .6; .31; .03; .02 INJECTION, SOLUTION INTRAVENOUS at 18:00

## 2022-02-01 RX ADMIN — HEPARIN SODIUM 5000 UNITS: 5000 INJECTION INTRAVENOUS; SUBCUTANEOUS at 21:48

## 2022-02-01 RX ADMIN — HYDROMORPHONE HYDROCHLORIDE 0.25 MG: 1 INJECTION, SOLUTION INTRAMUSCULAR; INTRAVENOUS; SUBCUTANEOUS at 08:53

## 2022-02-01 RX ADMIN — HYDROMORPHONE HYDROCHLORIDE: 10 INJECTION INTRAMUSCULAR; INTRAVENOUS; SUBCUTANEOUS at 12:29

## 2022-02-01 RX ADMIN — SODIUM CHLORIDE, SODIUM LACTATE, POTASSIUM CHLORIDE, AND CALCIUM CHLORIDE: .6; .31; .03; .02 INJECTION, SOLUTION INTRAVENOUS at 07:15

## 2022-02-01 RX ADMIN — PROPOFOL 150 MG: 10 INJECTION, EMULSION INTRAVENOUS at 07:39

## 2022-02-01 RX ADMIN — Medication 30 MG: at 10:21

## 2022-02-01 RX ADMIN — SODIUM CHLORIDE, SODIUM LACTATE, POTASSIUM CHLORIDE, AND CALCIUM CHLORIDE 125 ML/HR: .6; .31; .03; .02 INJECTION, SOLUTION INTRAVENOUS at 13:00

## 2022-02-01 RX ADMIN — SODIUM CHLORIDE, SODIUM LACTATE, POTASSIUM CHLORIDE, AND CALCIUM CHLORIDE 125 ML/HR: .6; .31; .03; .02 INJECTION, SOLUTION INTRAVENOUS at 21:52

## 2022-02-01 NOTE — INTERVAL H&P NOTE
H&P reviewed  After examining the patient I find no changes in the patients condition since the H&P had been written    Head, all 4s ok    Vitals:    02/01/22 0556   BP: 106/68   Pulse: 77   Resp: 16   SpO2: 98%

## 2022-02-01 NOTE — ANESTHESIA POSTPROCEDURE EVALUATION
Post-Op Assessment Note    CV Status:  Stable    Pain management: adequate     Mental Status:  Sleepy   Hydration Status:  Euvolemic   PONV Controlled:  Controlled   Airway Patency:  Patent      Post Op Vitals Reviewed: Yes      Staff: CRNA         No complications documented      BP (!) 99/46 (02/01/22 1154)    Temp 98 8 °F (37 1 °C) (02/01/22 1154)    Pulse 67 (02/01/22 1154)   Resp 18 (02/01/22 1154)    SpO2 100 % (02/01/22 1154)

## 2022-02-01 NOTE — OP NOTE
PERATIVE REPORT  PATIENT NAME: Mandeep Yang    :  1965  MRN: [id number]  Pt Location: BE OR ROOM 09    SURGERY DATE: 2022    Surgeon(s) and Role:  Panel 1:     * Katherin Ramos MD - Primary  Panel 2:     Jonathan Armstrong MD - Primary     * Josette Balndon DO - Assisting    Preop Diagnosis:  Diverticulitis [K57 92]    Post-Op Diagnosis Codes:     * Diverticulitis [K57 92]    Procedure(s) (LRB):  INSERTION URETERAL CATHETERS PREOP (Bilateral)  RESECTION COLON SIGMOID LAPAROSCOPIC HAND-ASSISTED AND TAKEDOWN OF COLOVESICAL FISTULA (N/A)    Specimen(s):  * No specimens in log *    Estimated Blood Loss:   50 mL    Drains:  NG/OG/Enteral Tube Orogastric 18 Fr Center mouth (Active)   Number of days: 0       Urethral Catheter Non-latex 16 Fr  (Active)   Number of days: 0       Ureteral Drain/Stent Left ureter 5 Fr  (Active)   Number of days: 0       Ureteral Drain/Stent Right ureter 5 Fr  (Active)   Number of days: 0       Anesthesia Type:   General    Operative Indications:  Patient with diverticulitis and colovesical fistula with ureteral stents requested for ureteral identification during surgery    Operative Findings:  Copious feculent material in bladder drained  Fistula seen at left dome of bladder, appeared 5mm in width  B/l open ended stents placed  Stents secured to chase catheter    Complications:   None    Procedure and Technique:  The patient was brought to the operating room  Anesthesia was induced  They were moved to dorsal lithotomy position  Antibiotics were confirmed  A time-out was performed identifying the correct patient, site, and procedure  A rigid 25 Citizen of the [place] cystoscope was introduced into the bladder  The urethra was unremarkable  The bladder was surveyed and had copious feculent debris   The bladder was drained and filled and drained multiple times to evacuate all feculent material   The bladder was evaluated and to a level is erythema this lesion at the left anterior dome was appreciated consistent with fistula measuring 5 mm in width  Attention was turned to the left ureteral orifice  A 5 [place] open-end catheter was used to intubate the ureteral orifice with aid of sensor wire  The 5fr catheter was passed up to the 22cm marcelo  The wire was removed  The scope was broken down and reintroduced  Attention was turned to the right ureteral orifice  A 5 [place] open-end catheter was used to intubate the ureteral orifice with aid of sensor wire  The 5fr catheter was passed up to the 22cm marcelo  The wire was removed  The scope was again broken down  A 16 [place] catheter was placed in the bladder and 10 cc in the balloon  The stents and catheter were introduced into a device to facilitate all drainage into the bag  The stents were secured to the catheter via 0 silk free ties and the blue clips  The bladder was drained  This completed the case  The patient was transferred care to Dr Cha Sr team      A qualified resident physician was not available    Patient Disposition:   To care of Dr Kaplan/[facility]      SIGNATURE: Jonathan Martin MD  DATE: February 1, 2022  TIME: 10:14 AM

## 2022-02-01 NOTE — PLAN OF CARE
Problem: PAIN - ADULT  Goal: Verbalizes/displays adequate comfort level or baseline comfort level  Description: Interventions:  - Encourage patient to monitor pain and request assistance  - Assess pain using appropriate pain scale  - Administer analgesics based on type and severity of pain and evaluate response  - Implement non-pharmacological measures as appropriate and evaluate response  - Consider cultural and social influences on pain and pain management  - Notify physician/advanced practitioner if interventions unsuccessful or patient reports new pain  Outcome: Progressing     Problem: INFECTION - ADULT  Goal: Absence or prevention of progression during hospitalization  Description: INTERVENTIONS:  - Assess and monitor for signs and symptoms of infection  - Monitor lab/diagnostic results  - Monitor all insertion sites, i e  indwelling lines, tubes, and drains  - Monitor endotracheal if appropriate and nasal secretions for changes in amount and color  - [place] appropriate cooling/warming therapies per order  - Administer medications as ordered  - Instruct and encourage patient and family to use good hand hygiene technique  - Identify and instruct in appropriate isolation precautions for identified infection/condition  Outcome: Progressing  Goal: Absence of fever/infection during neutropenic period  Description: INTERVENTIONS:  - Monitor WBC    Outcome: Progressing

## 2022-02-01 NOTE — OP NOTE
PERATIVE REPORT  PATIENT NAME: Kiley Velarde    :  1965  MRN: [id number]  Pt Location: BE OR ROOM 09    SURGERY DATE: 2022    Surgeon(s) and Role:  Panel 1:     * Meggan Chiang MD - Primary  Panel 2:     * Gavi Santiago MD - Primary     * Josette Malik DO - Assisting    Preop Diagnosis:  Colovesical fistula  Diverticulitis    Post-Op Diagnosis Codes:  Colovesical fistula  Diverticulitis    Procedure(s) (LRB):  INSERTION URETERAL CATHETERS PREOP (Bilateral)  RESECTION COLON SIGMOID LAPAROSCOPIC HAND-ASSISTED AND TAKEDOWN OF COLOVESICAL FISTULA (N/A), LAPAROSCOPIC MOBILIZATION OF SPLENIC FLEXURE, FLEXIBLE SIGMOIDOSCOPY    Specimen(s):  ID Type Source Tests Collected by Time Destination   1 :  Tissue Large Intestine, Sigmoid Colon TISSUE EXAM Gavi Santiago MD 2022 1028        Estimated Blood Loss:   100 mL    Drains:  NG/OG/Enteral Tube Orogastric 18 Fr Center mouth (Active)   Number of days: 0       Urethral Catheter Non-latex 16 Fr  (Active)   Number of days: 0       Ureteral Drain/Stent Left ureter 5 Fr  (Active)   Number of days: 0       Ureteral Drain/Stent Right ureter 5 Fr  (Active)   Number of days: 0       Anesthesia Type:   General    Operative Indications:  Colovesical fistula  Diverticulitis    Operative Findings:  Colovesical fistula  Severe scarring and phlegmon of sigmoid colon  Diverticulitis    Complications:   None    Procedure and Technique:  The patient was placed in a supine position with the arms at his sides  He was wrapped in pink pad material to protect the arms and was placed on a pink pad  The chest was wrapped with a pink pad strap  The abdomen was prepped widely using ChloraPrep and allowed to dry completely  The perineum was prepped using Betadine  The area was draped in a sterile manner  A time-out was done  A right supraumbilical 11 mm trocar was used to enter the peritoneum  This was done without any difficulty or injury to internal structures  The abdomen was insufflated  A right lower quadrant 12 mm trocar and a 5 mm right lateral trocar were also positioned  Laparoscopic surgery was initiated  The sigmoid colon was obviously tethered to the bladder  The omentum was also tethered to the area  The omentum was  and removed from the attachments  The sigmoid was  from the ladder with difficulty  We then noted that there were extensive lateral attachments to the pelvic and abdominal sidewall in the area of the left ureter  For that reason, I chose to place a GelPort for further dissection to allow for removal of the sigmoid colon with maximized safety  A left lower quadrant horizontally oriented muscle-splitting incision was created through the rectus fascia  The skin was opened with cautery, dissection down through the fascia was done with cautery, and the rectus muscle was split with entry to the peritoneum  It was distended to allow for positioning of a GelPort device  We did some of our dissection through the open GelPort, identifying planes with difficulty due to extensive and very severe scarring between the pelvic sidewall and the bowel itself  We stated adjacent to the bowel to avoid damage to retroperitoneal structures  These adhesions were broken down with some difficulty but the ureter was avoided  The left ureter could be identified by the stent that had been positioned in it and was avoided during the operation  Some of the lateral attachments were incompletely lysed with this method  We then moved to and a medial approach  Hand assisted medial approach was then used to identify the avascular plane posterior to the superior rectal artery  This plane was entered with relative ease and dissected into the retrorectal space for approximately 4 cm  The lateral attachments were divided  This allowed for greater access to the left side of mesentery of the colon along the planes of dissection    Again, the ureter was avoided  The right ureter was far lateral during our dissection  Once we were able to the undermined the mesentery of the rectum and sigmoid colon, a greater size window was made anterior to the retroperitoneal structures  These planes of dissection were then continued proximally along the descending colon, lifting the mesentery anteriorly and  from the retroperitoneal structures  The entire descending colon was thereby mobilized, anterior to the perinephric fat  Dissection was continued around the splenic flexure which was  from the omentum all the way onto the transverse colon  With this final dissection, the bowel was dropped into the pelvis, allowing the descending colon which was supple and unremarkable to be brought into the pelvis for anastomosis  The inferior mesenteric artery was left intact  The mesentery of the rectum was then sequentially divided using EnSeal at the sacral  promontory  The rectum was divided at the sacral promontory using 60 mm green loaded echelon stapler  We now delivered the bowel into the GelPort wound  The phlegmon was removed and the bowel proximal to it was palpated up to the point where was supple and unremarkable  There, we divided the mesentery to fashion the proximal side of the anastomosis  A pursestring device was used to wrap the 2 0 Prolene suture doubly around a full thickness, healthy collar of descending colon  It was tied into position, the bowel was cleansed, and gloves were changed after the specimen was sent from the field  The sigmoid that was removed was severely thickened, abnormal, and phlegmonous  Air insufflation was re-established and a hand assisted laparoscopic anastomosis was created using the 29 mm [facility] stapler device without any difficulty  Two full-thickness, circumferential donuts were obtained  There was no tension on the anastomosis whatsoever    Flexible sigmoidoscopy showed well-vascularized bowel proximal and distal to the anastomosis which was widely patent  The staple line was intact  The bowel was pink proximally and distally  Under water bath, there was no air leak  We then inspected all dissection fields  The splenic flexure was dry  The other areas were dry  No contamination occurred during the operation  The colovesical fistula did not reveal any obvious opening in the bladder  The bladder was decompressed during the operation and was left decompressed with a Gonzalez catheter  The anastomosis was again inspected and was found to be free of tension and well-vascularized  Trocars were removed  The right lower quadrant 12 mm trocar in the supraumbilical 11 mm trocar were closed using fascial sutures of 0 Vicryl  Wounds were irrigated and dried  The left lower quadrant wound was closed at the peritoneum using 0 Vicryl suture  The fascia was then closed using running 1 PDS suture  Wounds were irrigated and dried again and the skin was closed using running or interrupted subcuticular 4-0 Monocryl suture depending on the length of the wound  Exofin was applied  Sponge needle and instrument counts were correct  Wand technique revealed no retained gauze      I was present for the entire procedure    Patient Disposition:  PACU       SIGNATURE: Gianna Chaudhry MD  DATE: February 1, 2022  TIME: 11:27 AM

## 2022-02-01 NOTE — H&P
H&P Exam - Petey Shall 64 y o  male MRN: [id number]    Unit/Bed#: OR [place] Encounter: [id number]    Assessment:   63 y/o with colovesical fistula    Plan:  Bilateral ureteral stent placement  Consent obtained  History of Present Illness      63 y/o with colovesical fistula      Review of Systems   Constitutional: Negative for chills and fever  HENT: Negative for ear pain and sore throat  Eyes: Negative for pain and visual disturbance  Respiratory: Negative for cough and shortness of breath  Cardiovascular: Negative for chest pain and palpitations  Gastrointestinal: Positive for abdominal pain  Negative for vomiting  Genitourinary: Negative for dysuria and hematuria  Musculoskeletal: Negative for arthralgias and back pain  Skin: Negative for color change and rash  Neurological: Negative for seizures and syncope  All other systems reviewed and are negative        Historical Information   Past Medical History:   Diagnosis Date    Diverticulitis     Hypertension      Past Surgical History:   Procedure Laterality Date    NO PAST SURGERIES       Social History   Social History     Substance and Sexual Activity   Alcohol Use Not Currently    Comment: social     Social History     Substance and Sexual Activity   Drug Use No     Social History     Tobacco Use   Smoking Status Never Smoker   Smokeless Tobacco Never Used     E-Cigarette Use: Never User     E-Cigarette/Vaping Substances       Family History: non-contributory    Meds/Allergies   all medications and allergies reviewed  No Known Allergies    Objective   First Vitals:   Blood Pressure: 106/68 (02/01/22 0556)  Pulse: 77 (02/01/22 0556)  Respirations: 16 (02/01/22 0556)  Height: 5' 7" (170 2 cm) (02/01/22 0549)  Weight - Scale: 68 kg (150 lb) (02/01/22 0549)  SpO2: 98 % (02/01/22 0556)    Current Vitals:   Blood Pressure: 106/68 (02/01/22 0556)  Pulse: 77 (02/01/22 0556)  Respirations: 16 (02/01/22 0556)  Height: 5' 7" (170 2 cm) (02/01/22 3229)  Weight - Scale: 68 kg (150 lb) (02/01/22 0549)  SpO2: 98 % (02/01/22 0556)    No intake or output data in the 24 hours ending 02/01/22 0733    Invasive Devices  Report    Peripheral Intravenous Line            Peripheral IV 06/18/19 Right Antecubital 959 days    Peripheral IV 02/01/22 Left Hand <1 day                Physical Exam  Vitals and nursing note reviewed  Constitutional:       Appearance: He is well-developed  HENT:      Head: Normocephalic and atraumatic  Eyes:      Conjunctiva/sclera: Conjunctivae normal    Cardiovascular:      Rate and Rhythm: Normal rate and regular rhythm  Heart sounds: No murmur heard  Pulmonary:      Effort: Pulmonary effort is normal  No respiratory distress  Breath sounds: Normal breath sounds  Abdominal:      Palpations: Abdomen is soft  Tenderness: There is no abdominal tenderness  Musculoskeletal:      Cervical back: Neck supple  Skin:     General: Skin is warm and dry  Neurological:      Mental Status: He is alert

## 2022-02-02 LAB
ANION GAP SERPL CALCULATED.3IONS-SCNC: 5 MMOL/L (ref 4–13)
BASOPHILS # BLD AUTO: 0.03 THOUSANDS/ΜL (ref 0–0.1)
BASOPHILS NFR BLD AUTO: 0 % (ref 0–1)
BUN SERPL-MCNC: 12 MG/DL (ref 5–25)
CALCIUM SERPL-MCNC: 8.2 MG/DL (ref 8.3–10.1)
CHLORIDE SERPL-SCNC: 104 MMOL/L (ref 100–108)
CO2 SERPL-SCNC: 25 MMOL/L (ref 21–32)
CREAT SERPL-MCNC: 1 MG/DL (ref 0.6–1.3)
EOSINOPHIL # BLD AUTO: 0 THOUSAND/ΜL (ref 0–0.61)
EOSINOPHIL NFR BLD AUTO: 0 % (ref 0–6)
ERYTHROCYTE [DISTWIDTH] IN BLOOD BY AUTOMATED COUNT: 12.4 % (ref 11.6–15.1)
GFR SERPL CREATININE-BSD FRML MDRD: 83 ML/MIN/1.73SQ M
GLUCOSE SERPL-MCNC: 119 MG/DL (ref 65–140)
HCT VFR BLD AUTO: 33.2 % (ref 36.5–49.3)
HGB BLD-MCNC: 10.7 G/DL (ref 12–17)
IMM GRANULOCYTES # BLD AUTO: 0.03 THOUSAND/UL (ref 0–0.2)
IMM GRANULOCYTES NFR BLD AUTO: 0 % (ref 0–2)
LYMPHOCYTES # BLD AUTO: 0.98 THOUSANDS/ΜL (ref 0.6–4.47)
LYMPHOCYTES NFR BLD AUTO: 12 % (ref 14–44)
MAGNESIUM SERPL-MCNC: 1.9 MG/DL (ref 1.6–2.6)
MCH RBC QN AUTO: 30.3 PG (ref 26.8–34.3)
MCHC RBC AUTO-ENTMCNC: 32.2 G/DL (ref 31.4–37.4)
MCV RBC AUTO: 94 FL (ref 82–98)
MONOCYTES # BLD AUTO: 0.98 THOUSAND/ΜL (ref 0.17–1.22)
MONOCYTES NFR BLD AUTO: 12 % (ref 4–12)
NEUTROPHILS # BLD AUTO: 6.47 THOUSANDS/ΜL (ref 1.85–7.62)
NEUTS SEG NFR BLD AUTO: 76 % (ref 43–75)
NRBC BLD AUTO-RTO: 0 /100 WBCS
PHOSPHATE SERPL-MCNC: 3.3 MG/DL (ref 2.7–4.5)
PLATELET # BLD AUTO: 218 THOUSANDS/UL (ref 149–390)
PMV BLD AUTO: 9.8 FL (ref 8.9–12.7)
POTASSIUM SERPL-SCNC: 4.2 MMOL/L (ref 3.5–5.3)
RBC # BLD AUTO: 3.53 MILLION/UL (ref 3.88–5.62)
SODIUM SERPL-SCNC: 134 MMOL/L (ref 136–145)
WBC # BLD AUTO: 8.49 THOUSAND/UL (ref 4.31–10.16)

## 2022-02-02 PROCEDURE — 80048 BASIC METABOLIC PNL TOTAL CA: CPT | Performed by: COLON & RECTAL SURGERY

## 2022-02-02 PROCEDURE — 97163 PT EVAL HIGH COMPLEX 45 MIN: CPT

## 2022-02-02 PROCEDURE — 84100 ASSAY OF PHOSPHORUS: CPT | Performed by: COLON & RECTAL SURGERY

## 2022-02-02 PROCEDURE — 85025 COMPLETE CBC W/AUTO DIFF WBC: CPT | Performed by: COLON & RECTAL SURGERY

## 2022-02-02 PROCEDURE — 83735 ASSAY OF MAGNESIUM: CPT | Performed by: COLON & RECTAL SURGERY

## 2022-02-02 PROCEDURE — 97166 OT EVAL MOD COMPLEX 45 MIN: CPT

## 2022-02-02 RX ORDER — CALCIUM CARBONATE 200(500)MG
500 TABLET,CHEWABLE ORAL 3 TIMES DAILY PRN
Status: DISCONTINUED | OUTPATIENT
Start: 2022-02-02 | End: 2022-02-04 | Stop reason: HOSPADM

## 2022-02-02 RX ORDER — OXYCODONE HYDROCHLORIDE 10 MG/1
10 TABLET ORAL EVERY 4 HOURS PRN
Status: DISCONTINUED | OUTPATIENT
Start: 2022-02-02 | End: 2022-02-04 | Stop reason: HOSPADM

## 2022-02-02 RX ORDER — OXYCODONE HYDROCHLORIDE 5 MG/1
5 TABLET ORAL EVERY 4 HOURS PRN
Status: DISCONTINUED | OUTPATIENT
Start: 2022-02-02 | End: 2022-02-04 | Stop reason: HOSPADM

## 2022-02-02 RX ORDER — HYDROMORPHONE HCL IN WATER/PF 6 MG/30 ML
0.2 PATIENT CONTROLLED ANALGESIA SYRINGE INTRAVENOUS
Status: DISCONTINUED | OUTPATIENT
Start: 2022-02-02 | End: 2022-02-03

## 2022-02-02 RX ORDER — MAGNESIUM SULFATE 1 G/100ML
1 INJECTION INTRAVENOUS ONCE
Status: COMPLETED | OUTPATIENT
Start: 2022-02-02 | End: 2022-02-02

## 2022-02-02 RX ORDER — DEXTROSE, SODIUM CHLORIDE, AND POTASSIUM CHLORIDE 5; .45; .15 G/100ML; G/100ML; G/100ML
75 INJECTION INTRAVENOUS CONTINUOUS
Status: DISCONTINUED | OUTPATIENT
Start: 2022-02-02 | End: 2022-02-03

## 2022-02-02 RX ADMIN — HEPARIN SODIUM 5000 UNITS: 5000 INJECTION INTRAVENOUS; SUBCUTANEOUS at 22:10

## 2022-02-02 RX ADMIN — CALCIUM CARBONATE (ANTACID) CHEW TAB 500 MG 500 MG: 500 CHEW TAB at 03:17

## 2022-02-02 RX ADMIN — AMLODIPINE BESYLATE 5 MG: 5 TABLET ORAL at 08:50

## 2022-02-02 RX ADMIN — ACETAMINOPHEN 975 MG: 325 TABLET, FILM COATED ORAL at 13:50

## 2022-02-02 RX ADMIN — MAGNESIUM SULFATE HEPTAHYDRATE 1 G: 1 INJECTION, SOLUTION INTRAVENOUS at 10:30

## 2022-02-02 RX ADMIN — DEXTROSE, SODIUM CHLORIDE, AND POTASSIUM CHLORIDE 75 ML/HR: 5; .45; .15 INJECTION INTRAVENOUS at 06:58

## 2022-02-02 RX ADMIN — SODIUM CHLORIDE, SODIUM LACTATE, POTASSIUM CHLORIDE, AND CALCIUM CHLORIDE 125 ML/HR: .6; .31; .03; .02 INJECTION, SOLUTION INTRAVENOUS at 05:26

## 2022-02-02 RX ADMIN — HEPARIN SODIUM 5000 UNITS: 5000 INJECTION INTRAVENOUS; SUBCUTANEOUS at 13:49

## 2022-02-02 RX ADMIN — HEPARIN SODIUM 5000 UNITS: 5000 INJECTION INTRAVENOUS; SUBCUTANEOUS at 05:19

## 2022-02-02 RX ADMIN — DEXTROSE, SODIUM CHLORIDE, AND POTASSIUM CHLORIDE 75 ML/HR: 5; .45; .15 INJECTION INTRAVENOUS at 20:01

## 2022-02-02 RX ADMIN — ACETAMINOPHEN 975 MG: 325 TABLET, FILM COATED ORAL at 05:19

## 2022-02-02 RX ADMIN — ACETAMINOPHEN 975 MG: 325 TABLET, FILM COATED ORAL at 22:10

## 2022-02-02 NOTE — OCCUPATIONAL THERAPY NOTE
Occupational Therapy Evaluation     Patient Name: Geno Golden  RDSXE'[id number] Date: 2/2/2022  Problem List  Principal Problem:    Colovesical fistula  Active Problems:    Diverticulitis of colon with perforation    Past Medical History  Past Medical History:   Diagnosis Date    Diverticulitis     Hypertension      Past Surgical History  Past Surgical History:   Procedure Laterality Date    NO PAST SURGERIES             02/02/22 0946   OT Last Visit   OT Visit Date 02/02/22   Note Type   Note type Evaluation   Restrictions/Precautions   Weight Bearing Precautions Per Order No   Other Precautions Fall Risk;Pain;Multiple lines  (PCA, chase)   Pain Assessment   Pain Assessment Tool 0-10   Pain Score No Pain   Pain Location/Orientation Orientation: Bilateral;Location: Abdomen   Hospital Pain Intervention(s) Ambulation/increased activity;Repositioned   Home Living   Type of Home House   Home Layout Multi-level; Able to live on main level with bedroom/bathroom; Performs ADLs on one level  (3SH)   Bathroom Shower/Tub Tub/shower unit   Bathroom Toilet Standard   Bathroom Equipment Grab bars in [employer]   (denies)   Additional Comments Pt lives alone in [facility] with full flight to enter  Prior Function   Level of [place] Independent with ADLs and functional mobility   Lives With Alone   Receives Help From Family  (Ex-wife and son)   ADL Assistance Independent   IADLs Independent   Falls in the last 6 months 0   Vocational Full time employment  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   Psychosocial   Psychosocial ([address]) WDL   ADL   Where Assessed Chair   Eating Assistance 7  Independent   Grooming Assistance 7  Independent   UB Bathing Assistance 5  Supervision/Setup   LB [address] [employer] [address] 4  Minimal Assistance   Additional Comments Pt educated on energy conservation techniques with bathing and LB dressing upon DC home  Pt with no further concerns and with supportive family able to assist    Bed Mobility   Supine to Sit 4  Minimal assistance   Additional items Increased time required;Verbal cues   Sit to Supine Unable to assess   Additional Comments Pt greeted supine in bed and left OOB in recliner chair at end of session  All needs met and call bell nearby  Transfers   Sit to Stand 5  Supervision   Additional items Increased time required;Verbal cues   Stand to Sit 5  Supervision   Additional items Increased time required;Verbal cues   Functional Mobility   Functional Mobility 5  Supervision   Additional Comments S with RW  Household distances  Additional items Rolling walker   Balance   Static Sitting Fair +   Dynamic Sitting Fair   Static Standing Fair   Dynamic Standing [address],Floors 3,4, & 5 -   Activity Tolerance   Activity Tolerance Patient tolerated treatment well   Medical Staff Made Aware Co-eval with Cande Houston, PT 2* to Pt's medical complexity, decreased endurance, and post-surgical   Nurse Made Aware RN cleared/updated  RUE Assessment   RUE Assessment WFL   LUE Assessment   LUE Assessment WFL   Hand Function   Gross Motor Coordination Functional   Fine Motor Coordination Functional   Sensation   Light Touch No apparent deficits   Cognition   Overall Cognitive Status WFL   Arousal/Participation Alert; Cooperative   Attention Within functional limits   Orientation Level Oriented X4   Memory Within functional limits   Following Commands Follows all commands and directions without difficulty   Comments Pt very pleasant and cooperative during OT session  Assessment   Limitation Decreased ADL status; Decreased high-level ADLs; Decreased endurance   Prognosis Good   Assessment Pt is a 65 yo Male who presented to [facility] on 2/[facility]/2022 with colovesical fistula and diverticulitis  Pt s/p lap hand assisted sigmoidectomy and takedown of colovesical fistula with B/L stent placement and removal on 2/1/2022  Pt  has a past medical history of Diverticulitis and Hypertension  Pt greeted bedside for OT evaluation on 2/2/2022  Pt lives alone in [facility] with full flight to enter  PTA, Pt I with ADLs/IADLs/no AD/ +/works FT  Pt with supportive ex-wife and son able to assist upon DC  Pt demonstrating the following occupational deficits: S with UB ADLs, min A with LB ADLs, min A with bed mobility, S with functional transfers, and S with functional mobility with RW  Pt educated on energy conservation techniques with bathing and LB dressing upon DC home  Pt with no further acute OT concerns and with supportive family able to assist  Pt would benefit from returning home with increased family support upon DC to maximize safety and independence with ADLs and functional tasks of choice  DC skilled OT services  Goals   Patient Goals To feel better   Plan   OT Frequency Eval only   Recommendation   OT Discharge Recommendation No rehabilitation needs   OT - OK to Discharge Yes   Additional Comments  The patient's raw score on the AM-PAC Daily Activity inpatient short form is 21, standardized score is 44 27, greater than 39 4  Patients at this level are likely to benefit from discharge to home  Please refer to the recommendation of the Occupational Therapist for safe discharge planning     AM-PAC Daily Activity Inpatient   Lower Body Dressing 3   Bathing 3   Toileting 3   Upper Body Dressing 4   Grooming 4   Eating 4   Daily Activity Raw Score 21   Daily Activity Standardized Score (Calc for Raw Score >=11) 44 27   AM-PAC Applied Cognition Inpatient Following a Speech/Presentation 4   Understanding Ordinary Conversation 4   Taking Medications 4   Remembering Where Things Are Placed or Put Away 4   Remembering List of 4-5 Errands 4   Taking Care of Complicated Tasks 4   Applied Cognition Raw Score 24   Applied Cognition Standardized Score 62 21     Carla Apodaca MS, OTR/L

## 2022-02-02 NOTE — PHYSICAL THERAPY NOTE
PHYSICAL THERAPY EVALUATION  NAME:  Katherin Mcmullen  DATE: 02/02/22    AGE:   64 y o  Mrn:   [id number]  ADMIT DX:  Diverticulitis [K57 92]    Past Medical History:   Diagnosis Date    Diverticulitis     Hypertension      Past Surgical History:   Procedure Laterality Date    NO PAST SURGERIES      MO LAP,SURG,COLECTOMY, PARTIAL, W/ANAST N/A 2/1/2022    Procedure: RESECTION COLON SIGMOID LAPAROSCOPIC HAND-ASSISTED AND TAKEDOWN OF COLOVESICAL FISTULA;  Surgeon: Lul Arredondo MD;  [facility]: [facility];  Service: Colorectal       Length Of Stay: 1  PHYSICAL THERAPY EVALUATION :    02/02/22 0919   PT Last Visit   PT Visit Date 02/02/22   Note Type   Note type Evaluation   Pain Assessment   Pain Assessment Tool 0-10   Pain Score 3   Pain Location/Orientation Orientation: Mid;Orientation: Lower; Location: Abdomen   Pain Radiating Towards none    Restrictions/Precautions   Weight Bearing Precautions Per Order No   Braces or Orthoses   (none )   Other Precautions Multiple lines;Pain  (PCA chase)   Home Living   Type of Home House   Home Layout Multi-level  (flight w/ rail to enter )   Home Equipment   (none)   Additional Comments lives alone [facility] w/ FF to enter was I and not using AD PTA; active; drives; works FT in [address] [place] [address] and [employer]   will have care from ex sposue and son on d/c who will be staying w/ him to assist     Prior Function   Level of [place] Independent with ADLs and functional mobility   Lives With Alone   Receives Help From Family   ADL Assistance Independent   IADLs Independent   Falls in the last 6 months 0   Vocational Full time employment   Cognition   Overall Cognitive Status WFL   Arousal/Participation Alert   Attention Within functional limits   Orientation Level Oriented X4   Memory Within functional limits   Following Commands Follows all commands and directions without difficulty   RUE Assessment   RUE Assessment WFL   LUE Assessment   LUE Assessment WFL   RLE Assessment   RLE Assessment [facility] LLE Assessment   LLE Assessment WFL   Coordination   Movements are Fluid and Coordinated   (slow; guarded; antalgic )   Light Touch   RLE Light Touch Grossly intact   LLE Light Touch Grossly intact   Bed Mobility   Supine to Sit 4  Minimal assistance   Additional items Increased time required;Verbal cues   Additional Comments OOB in recliner post session    Transfers   Sit to Stand 5  Supervision   Stand to Sit 5  Supervision   Stand pivot 5  Supervision  (w/ RW)   Ambulation/Elevation   Gait pattern Excessively slow; Short stride   Gait Assistance 5  Supervision   Assistive Device Rolling walker   Distance 80 ;limited by fatigue and pain - denies dizziness    Balance   Static Sitting Fair +   Dynamic Sitting Fair   Static Standing Fair   Dynamic Standing 1800 [address],Floors 3,4, & 5 -  (rw)   Endurance Deficit   Endurance Deficit Yes   Endurance Deficit Description fatigue and pain limiting    Activity Tolerance   Activity Tolerance Patient tolerated treatment well   Medical Staff Made Aware Co- eval was performed w/ OT today  This pt's participation in skilled therapies requires skilled Ax2 2*medical complexity; decreased activity tolerance; pain; limited endurance and for safety  Nurse Made Aware yes - Kristie    Assessment   Prognosis Excellent   Problem List Decreased range of motion;Decreased endurance; Impaired balance;Decreased mobility; Decreased skin integrity;Pain   Barriers to Discharge None   Goals   Patient Goals feel better    STG Expiration Date 02/12/22   Short Term Goal #1 In 10 days pt will complete: 1) Bed mobility skills with MI to facilitate safe return to previous living environment and decrease burden on caregivers  2) Functional transfers with MI  to facilitate safe return to previous living environment  3) Ambulation with least restrictive AD MI/ indep' > 250  without LOB and stable vitals for safe ambulation in home/ community environment   4) Stair training up/ down flight (12)  step/s with 1 rail/s  and S for safe access to previous living environment and to increase community access  5) Improve balance by 1 grade in order to decrease fall risk  6) PT for ongoing pt and family education; DME needs and D/C planning to promote highest level of function in least restrictive environment  PT Treatment Day 0   Plan   Treatment/Interventions ADL retraining;Functional transfer training;LE strengthening/ROM; Elevations; Therapeutic exercise; Endurance training;Patient/family training;Equipment eval/education; Bed mobility;Gait training; Compensatory technique education;Spoke to nursing;Spoke to case management;Spoke to advanced practitioner;OT   Recommendation   PT Discharge Recommendation No rehabilitation needs  (w/ progress)   Equipment Recommended Walker   AM-PAC Basic Mobility Inpatient   Turning in Bed Without Bedrails 3   Lying on Back to Sitting on Edge of Flat Bed 3   Moving Bed to Chair 3   Standing Up From Chair 3   Walk in Room 3   Climb 3-5 Stairs 3   Basic Mobility Inpatient Raw Score 18   Basic Mobility Standardized Score 41 05   Highest Level Of Mobility   [facility] Goal 6: Walk 10 steps or more     Pt is 64 y o  male seen for PT evaluation s/p admit to [facility] on 2/1/2022  Pt presenting w/ dx of colovesical fistula; diverticulitis of colon w/ perforation pt is s/p   INSERTION URETERAL CATHETERS PREOP (Bilateral) RESECTION COLON SIGMOID LAPAROSCOPIC HAND-ASSISTED AND TAKEDOWN OF COLOVESICAL FISTULA (N/A), LAPAROSCOPIC MOBILIZATION OF SPLENIC FLEXURE, FLEXIBLE SIGMOIDOSCOPY on 2/1/22   Comorbidities affecting pt's physical performance at time of assessment listed above and significant for:  has a past medical history of Diverticulitis and Hypertension  Personal factors affecting pt at time of IE include: steps to enter environment,  limited insight into impairments and  Unable to currently perform job functions     Due to acute medical issues, ongoing medical workup and management for primary dx; post op pain/ PCA; , fall risk, increased reliance on more restrictive AD compared to baseline;  decreased activity tolerance compared to baseline, increased assistance needed from caregiver at current time, continuous monitoring, trending labs multiple lines, decline in overall functional mobility status; health management issues; note unstable clinical picture (high complexity)   Prior to admission, pt reports being totally independent, not using assistive devices: 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[employer]  Will have supportive ex spouse and son staying w/ him on d/c  Pt has FF to enter home Currently pt  is requiring Shay A for bed skills; Shay for functional transfers and Close S for ambulation w/ RW 80' x1 w/ spow jovita and w/o dizziness  Pt presents functioning below baseline and currently w/ overall mobility deficits 2* to: post op pain;decreased endurance; decreased activity tolerance; impaired balance; Fatigue  multiple lines; chase and PCA pump;  (Please find additional objective findings from PT assessment regarding body systems outlined above ) Pt will continue to benefit from skilled PT interventions to address stated impairments; to maximize functional potential; for ongoing pt/ family training; and DME needs  Anticipate that with continued progress pt to d/c home with family support and possibly [facility] vs no needs  (pending progress) when medically cleared  Pt/ family agreeable to plan and goals as stated on evaluation  The patient's AM-PAC Basic Mobility Inpatient Short Form Raw Score is 18  A Raw score of greater than 16 suggests the patient may benefit from discharge to home  Please also refer to the recommendation of the Physical Therapist for safe discharge planning

## 2022-02-02 NOTE — PLAN OF CARE
Problem: PHYSICAL THERAPY ADULT  Goal: Performs mobility at highest level of function for planned discharge setting  See evaluation for individualized goals  Description: Treatment/Interventions: ADL retraining,Functional transfer training,LE strengthening/ROM,Elevations,Therapeutic exercise,Endurance training,Patient/family training,Equipment eval/education,Bed mobility,Gait training,Compensatory technique education,Spoke to nursing,Spoke to case management,Spoke to advanced practitioner,OT  Equipment Recommended: Bear Roque   Pt is 64 y o  male seen for PT evaluation s/p admit to [facility] on 2/1/2022  Pt presenting w/ dx of colovesical fistula; diverticulitis of colon w/ perforation pt is s/p   INSERTION URETERAL CATHETERS PREOP (Bilateral) RESECTION COLON SIGMOID LAPAROSCOPIC HAND-ASSISTED AND TAKEDOWN OF COLOVESICAL FISTULA (N/A), LAPAROSCOPIC MOBILIZATION OF SPLENIC FLEXURE, FLEXIBLE SIGMOIDOSCOPY on 2/1/22   Comorbidities affecting pt's physical performance at time of assessment listed above and significant for:  has a past medical history of Diverticulitis and Hypertension  Personal factors affecting pt at time of IE include: steps to enter environment,  limited insight into impairments and  Unable to currently perform job functions  Due to acute medical issues, ongoing medical workup and management for primary dx; post op pain/ PCA; , fall risk, increased reliance on more restrictive AD compared to baseline;  decreased activity tolerance compared to baseline, increased assistance needed from caregiver at current time, continuous monitoring, trending labs multiple lines, decline in overall functional mobility status; health management issues; note unstable clinical picture (high complexity)   Prior to admission, pt reports being totally independent, not using assistive devices: 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[employer]  Will have supportive ex spouse and son staying w/ him on d/c   Pt has FF to enter home Currently pt  is requiring Shay A for bed skills; Shay for functional transfers and Close S for ambulation w/ RW 80' x1 w/ spow jovita and w/o dizziness  Pt presents functioning below baseline and currently w/ overall mobility deficits 2* to: post op pain;decreased endurance; decreased activity tolerance; impaired balance; Fatigue  multiple lines; chase and PCA pump;  (Please find additional objective findings from PT assessment regarding body systems outlined above ) Pt will continue to benefit from skilled PT interventions to address stated impairments; to maximize functional potential; for ongoing pt/ family training; and DME needs  Anticipate that with continued progress pt to d/c home with family support and possibly [facility] vs no needs  (pending progress) when medically cleared  Pt/ family agreeable to plan and goals as stated on evaluation  See flowsheet documentation for full assessment, interventions and recommendations  Note: Prognosis: Excellent  Problem List: Decreased range of motion,Decreased endurance,Impaired balance,Decreased mobility,Decreased skin integrity,Pain     Barriers to Discharge: None        PT Discharge Recommendation: No rehabilitation needs (w/ progress)   Pt is 64 y o  male seen for PT evaluation s/p admit to [facility] on 2/1/2022  Pt presenting w/ dx of colovesical fistula; diverticulitis of colon w/ perforation pt is s/p   INSERTION URETERAL CATHETERS PREOP (Bilateral) RESECTION COLON SIGMOID LAPAROSCOPIC HAND-ASSISTED AND TAKEDOWN OF COLOVESICAL FISTULA (N/A), LAPAROSCOPIC MOBILIZATION OF SPLENIC FLEXURE, FLEXIBLE SIGMOIDOSCOPY on 2/1/22   Comorbidities affecting pt's physical performance at time of assessment listed above and significant for:  has a past medical history of Diverticulitis and Hypertension   Personal factors affecting pt at time of IE include: steps to enter environment,  limited insight into impairments and  Unable to currently perform job functions  Due to acute medical issues, ongoing medical workup and management for primary dx; post op pain/ PCA; , fall risk, increased reliance on more restrictive AD compared to baseline;  decreased activity tolerance compared to baseline, increased assistance needed from caregiver at current time, continuous monitoring, trending labs multiple lines, decline in overall functional mobility status; health management issues; note unstable clinical picture (high complexity)   Prior to admission, pt reports being totally independent, not using assistive devices: 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[employer]  Will have supportive ex spouse and son staying w/ him on d/c  Pt has FF to enter home Currently pt  is requiring Shay A for bed skills; Shay for functional transfers and Close S for ambulation w/ RW 80' x1 w/ spow jovita and w/o dizziness  Pt presents functioning below baseline and currently w/ overall mobility deficits 2* to: post op pain;decreased endurance; decreased activity tolerance; impaired balance; Fatigue  multiple lines; chase and PCA pump;  (Please find additional objective findings from PT assessment regarding body systems outlined above ) Pt will continue to benefit from skilled PT interventions to address stated impairments; to maximize functional potential; for ongoing pt/ family training; and DME needs  Anticipate that with continued progress pt to d/c home with family support and possibly [facility] vs no needs  (pending progress) when medically cleared  Pt/ family agreeable to plan and goals as stated on evaluation  See flowsheet documentation for full assessment

## 2022-02-02 NOTE — QUICK NOTE
Post op check    Doing well  Some discomfort in LLQ however to be expected      Gonzalez with clots however this is to be expected given pre operative stenting    Abdomen is soft, appropriately tender in LLQ    Incisions all c/d/i

## 2022-02-02 NOTE — CASE MANAGEMENT
Case Management Discharge Planning Note    Patient name Lio Aguila  [address]67 MRN [id number]  : 1965 Date 2022       Current Admission Date: 2022  Current Admission Diagnosis:Colovesical fistula   Patient Active Problem List    Diagnosis Date Noted    Colovesical fistula 2022    Diverticulitis of colon with perforation 2022    Gross hematuria 2022    Hypertension     Prostate cancer screening     Nausea & vomiting 2019    Healthcare maintenance 2018    Diverticulitis 2018    Diverticulosis of large intestine 2018      LOS (days): 1  Geometric Mean LOS (GMLOS) (days): 3 30  Days to GMLOS:2 3     OBJECTIVE:  Risk of Unplanned Readmission Score: 8         Current admission status: Inpatient   Preferred Pharmacy:   [address] [place] [address]  Phone: [phone] Fax: [phone] - [address]  Phone: [phone] Fax: [phone]    [address] [place], [address] [facility] [address] [facility]  Phone: [phone] Fax: [phone]    Primary Care Provider: Kaia Freeman MD    Primary Insurance: Will   Secondary Insurance:     DISCHARGE DETAILS:    Discharge planning discussed with[de-identified] Patient  Freedom of Choice: Yes     CM contacted family/caregiver?: No- see comments  Were Treatment Team discharge recommendations reviewed with patient/caregiver?: Yes  Did patient/caregiver verbalize understanding of patient care needs?: Yes  Were patient/caregiver advised of the risks associated with not following Treatment Team discharge recommendations?: Yes             Additional Comments: Pt discussed with [facility] for care coordination  Plan dc in few days  Will go home with rena   S/w pt & offered vna  Pt declines  States himself & family will care for chase  Updated [facility]  1350 update: S/w pt about PT recommendations for rw  Pt declines

## 2022-02-02 NOTE — CASE MANAGEMENT
Case Management Assessment & Discharge Planning Note    Patient name Don Torre  Location 99 [facility] 821/PPHP 147-79 MRN [id number]  : 1965 Date 2022       Current Admission Date: 2022  Current Admission Diagnosis:Colovesical fistula   Patient Active Problem List    Diagnosis Date Noted    Colovesical fistula 2022    Diverticulitis of colon with perforation 2022    Gross hematuria 2022    Hypertension     Prostate cancer screening     Nausea & vomiting 2019    Healthcare maintenance 2018    Diverticulitis 2018    Diverticulosis of large intestine 2018      LOS (days): 1  Geometric Mean LOS (GMLOS) (days): 3 30  Days to GMLOS:2 4     OBJECTIVE:    Risk of Unplanned Readmission Score: 8         Current admission status: Inpatient       Preferred Pharmacy:   [address] [place] [address]  Phone: [phone] Fax: [phone]    [facility] [address]  Phone: [phone] Fax: [phone]    100 [place],[address], [address] [place] [facility] [place] [address] 210 [facility]  Phone: [phone] Fax: [phone]    Primary Care Provider: Price Cruz MD    Primary Insurance: [facility]  Secondary Insurance:     ASSESSMENT:  Active Health Care Agents    There are no active Health Care Agents on file  Patient Information  Admitted from[de-identified] Home  Mental Status: Alert  During Assessment patient was accompanied by: Not accompanied during assessment  Assessment information provided by[de-identified] Patient  Primary Caregiver: Self  Support Systems: Spouse/significant other,Son  Home entry access options   Select all that apply : Stairs  Number of steps to enter home : One Flight  Do the steps have railings?: Yes  Type of Current Residence: 3 story home  Upon entering residence, is there a bedroom on the main floor (no further steps)?: No  A bedroom is located on the following floor levels of residence (select all that apply):: 2nd Floor  Upon entering residence, is there a bathroom on the main floor (no further steps)?: No  Indicate which floors of current residence have a bathroom (select all the apply):: 2nd Floor  Number of steps to 2nd floor from main floor: One Flight  Living Arrangements: Lives Alone    Activities of Daily Living Prior to Admission  Functional Status: Independent  Completes ADLs independently?: Yes  Ambulates independently?: Yes  Does patient use assisted devices?: No  Does patient currently own DME?: No  Does patient have a history of Outpatient Therapy (PT/OT)?: No  Does the patient have a history of Short-Term Rehab?: No  Does patient have a history of HHC?: No  Does patient currently have [employer] ?: No         Patient Information Continued  Income Source: Employed  Does patient receive dialysis treatments?: No  Does patient have a history of substance abuse?: No  Does patient have a history of Mental Health Diagnosis?: No         Means of Transportation  [facility]        DISCHARGE DETAILS:    Discharge planning discussed with[de-identified] Patient  Freedom of Choice: Yes                   Contacts  Patient Contacts: Son Monse Barker  Relationship to Patient[de-identified] Family  Contact Method: Phone  Phone Number: [phone]  Reason/Outcome: Emergency Contact               Met with pt & explained CM role  Pt lives alone in 3 sty home  Basement & 2 levels  Has flight of steps to main level  Plans to stay on that floor upon dc & has bathroom & guest room there  His ex-wife & son will stay with him upon dc CM to follow for dc needs

## 2022-02-02 NOTE — RESTORATIVE TECHNICIAN NOTE
Restorative Technician Note      Patient Name: Rosario Burgess     Restorative Tech Visit Date: 02/02/22  Note Type: Mobility  Patient Position Upon Consult: Bedside chair  Activity Performed: Ambulated  Assistive Device: Roller walker  Patient Position at End of Consult: All needs within reach;  Bedside chair    Liliam Foreman  DPT, Restorative Technician

## 2022-02-02 NOTE — PLAN OF CARE
Problem: PAIN - ADULT  Goal: Verbalizes/displays adequate comfort level or baseline comfort level  Description: Interventions:  - Encourage patient to monitor pain and request assistance  - Assess pain using appropriate pain scale  - Administer analgesics based on type and severity of pain and evaluate response  - Implement non-pharmacological measures as appropriate and evaluate response  - Consider cultural and social influences on pain and pain management  - Notify physician/advanced practitioner if interventions unsuccessful or patient reports new pain  Outcome: Progressing     Problem: INFECTION - ADULT  Goal: Absence or prevention of progression during hospitalization  Description: INTERVENTIONS:  - Assess and monitor for signs and symptoms of infection  - Monitor lab/diagnostic results  - Monitor all insertion sites, i e  indwelling lines, tubes, and drains  - Monitor endotracheal if appropriate and nasal secretions for changes in amount and color  - [place]mo appropriate cooling/warming therapies per order  - Administer medications as ordered  - Instruct and encourage patient and family to use good hand hygiene technique  - Identify and instruct in appropriate isolation precautions for identified infection/condition  Outcome: Progressing  Goal: Absence of fever/infection during neutropenic period  Description: INTERVENTIONS:  - Monitor WBC    Outcome: Progressing     Problem: SAFETY ADULT  Goal: Patient will remain free of falls  Description: INTERVENTIONS:  - Educate patient/family on patient safety including physical limitations  - Instruct patient to call for assistance with activity   - Consult OT/PT to assist with strengthening/mobility   - Keep Call bell within reach  - Keep bed low and locked with side rails adjusted as appropriate  - Keep care items and personal belongings within reach  - Initiate and maintain comfort rounds  - Consider moving patient to room near nurses station  Outcome: Progressing  Goal: Maintain or return to baseline ADL function  Description: INTERVENTIONS:  -  Assess patient's ability to carry out ADLs; assess patient's baseline for ADL function and identify physical deficits which impact ability to perform ADLs (bathing, care of mouth/teeth, toileting, grooming, dressing, etc )  - Assess/evaluate cause of self-care deficits   - Assess range of motion  - Assess patient's mobility; develop plan if impaired  - Assess patient's need for assistive devices and provide as appropriate  - Encourage maximum independence but intervene and supervise when necessary  - Involve family in performance of ADLs  - Assess for home care needs following discharge   - Consider OT consult to assist with ADL evaluation and planning for discharge  - Provide patient education as appropriate  Outcome: Progressing  Goal: Maintains/Returns to pre admission functional level  Description: INTERVENTIONS:  - Perform BMAT or MOVE assessment daily    - Set and communicate daily mobility goal to care team and patient/family/caregiver     - Collaborate with rehabilitation services on mobility goals if consulted  - Ambulate patient 3 times a day  - Record patient progress and toleration of activity level   Outcome: Progressing     Problem: DISCHARGE PLANNING  Goal: Discharge to home or other facility with appropriate resources  Description: INTERVENTIONS:  - Identify barriers to discharge w/patient and caregiver  - Arrange for needed discharge resources and transportation as appropriate  - Identify discharge learning needs (meds, wound care, etc )  - Arrange for interpretive services to assist at discharge as needed  - Refer to Case Management Department for coordinating discharge planning if the patient needs post-hospital services based on physician/advanced practitioner order or complex needs related to functional status, cognitive ability, or social support system  Outcome: Progressing     Problem: Knowledge Deficit  Goal: Patient/family/caregiver demonstrates understanding of disease process, treatment plan, medications, and discharge instructions  Description: Complete learning assessment and assess knowledge base    Interventions:  - Provide teaching at level of understanding  - Provide teaching via preferred learning methods  Outcome: Progressing     Problem: Potential for Falls  Goal: Patient will remain free of falls  Description: INTERVENTIONS:  - Educate patient/family on patient safety including physical limitations  - Instruct patient to call for assistance with activity   - Consult OT/PT to assist with strengthening/mobility   - Keep Call bell within reach  - Keep bed low and locked with side rails adjusted as appropriate  - Keep care items and personal belongings within reach  - Initiate and maintain comfort rounds  - Consider moving patient to room near nurses station  Outcome: Progressing     Problem: MOBILITY - ADULT  Goal: Maintain or return to baseline ADL function  Description: INTERVENTIONS:  -  Assess patient's ability to carry out ADLs; assess patient's baseline for ADL function and identify physical deficits which impact ability to perform ADLs (bathing, care of mouth/teeth, toileting, grooming, dressing, etc )  - Assess/evaluate cause of self-care deficits   - Assess range of motion  - Assess patient's mobility; develop plan if impaired  - Assess patient's need for assistive devices and provide as appropriate  - Encourage maximum independence but intervene and supervise when necessary  - Involve family in performance of ADLs  - Assess for home care needs following discharge   - Consider OT consult to assist with ADL evaluation and planning for discharge  - Provide patient education as appropriate  Outcome: Progressing  Goal: Maintains/Returns to pre admission functional level  Description: INTERVENTIONS:  - Perform BMAT or MOVE assessment daily    - Set and communicate daily mobility goal to care team and patient/family/caregiver     - Collaborate with rehabilitation services on mobility goals if consulted  - Ambulate patient 3 times a day  - Record patient progress and toleration of activity level   Outcome: Progressing

## 2022-02-02 NOTE — PROGRESS NOTES
Progress Note -  Colorectal Surgery   Petey Johnston 64 y o  male MRN: [id number]  Unit/Bed#: [facility] 821-01 Encounter: [id number]    Assessment:  64 y o  M with history of diverticulitis and a colovesical fistula, now s/p laparoscopic hand assisted sigmoidectomy and takedown of colovesical fistula with bilateral ureteral stent placement and removal 2/1     Afebrile  VSS on 2 L NC  Urine output 2 2L    Plan:  Advance to clears/crackers/toast   Maintenance IVFs @ 75  PCA-D for analgesia, add oral regimen   Incentive spirometry  OOB/Ambulate  PT/OT  DVT ppx       Subjective/Objective     Subjective: No acute events overnight  Pain is controlled on prn analgesia  No flatus or BMs today  Tolerating diet without nausea/vomiting  No fevers, chills  Objective:     Blood pressure 116/59, pulse 72, temperature 99 6 °F (37 6 °C), resp  rate 16, height 5' 7" (1 702 m), weight 68 kg (150 lb), SpO2 98 %  ,Body mass index is 23 49 kg/m²  Intake/Output Summary (Last 24 hours) at 2/2/2022 0631  Last data filed at 2/2/2022 0519  Gross per 24 hour   Intake 5287 11 ml   Output 2325 ml   Net 2962  11 ml       Invasive Devices  Report    Peripheral Intravenous Line            Peripheral IV 02/01/22 Left Wrist <1 day    Peripheral IV 02/01/22 Left;Dorsal (posterior) Hand <1 day          Drain            Urethral Catheter Non-latex 16 Fr  <1 day                Physical Exam:  NAD, alert and oriented x3  Normocephalic, atraumatic  MMM  Norm resp effort on 2 L NC  Regular rate  Abd soft, nondistended, appropriately tender, incisions c/d/i  Gonzalez in place with blood tinged urine  No calf tenderness or peripheral edema  Motor/sensation intact in distal extremities  CN grossly intact  -rash/lesions      Lab, Imaging and other studies:  CBC:   Lab Results   Component Value Date    WBC 8 49 02/02/2022    HGB 10 7 (L) 02/02/2022    HCT 33 2 (L) 02/02/2022    MCV 94 02/02/2022     02/02/2022    MCH 30 3 02/02/2022    MCHC 32 2 02/02/2022 RDW 12 4 02/02/2022    MPV 9 8 02/02/2022    NRBC 0 02/02/2022   , CMP:   Lab Results   Component Value Date    SODIUM 134 (L) 02/02/2022    K 4 2 02/02/2022     02/02/2022    CO2 25 02/02/2022    BUN 12 02/02/2022    CREATININE 1 00 02/02/2022    CALCIUM 8 2 (L) 02/02/2022    EGFR 83 02/02/2022     VTE Pharmacologic Prophylaxis: Heparin  VTE Mechanical Prophylaxis: sequential compression device

## 2022-02-02 NOTE — UTILIZATION REVIEW
Initial Clinical Review    Elective IP surgical procedure  Age/Sex: 64 y o  male  Surgery Date: 2/1/2022  Procedure: INSERTION URETERAL CATHETERS PREOP (Bilateral)  RESECTION COLON SIGMOID LAPAROSCOPIC HAND-ASSISTED AND TAKEDOWN OF COLOVESICAL FISTULA    FISTULA (N/A), LAPAROSCOPIC MOBILIZATION OF SPLENIC FLEXURE, FLEXIBLE SIGMOIDOSCOPY  Anesthesia: General  Operative Findings:   Copious feculent material in bladder drained  Fistula seen at left dome of bladder, appeared 5mm in width  B/l open ended stents placed  Stents secured to chase catheter  Colovesical fistula  Severe scarring and phlegmon of sigmoid colon  Diverticulitis       POD#1 Progress Note: No acute events overnight  Pain is controlled on prn analgesia  No flatus or BMs today  Tolerating diet without n/v  VSS on 2 L NC, UO 2 2L  Advance to clears, crackers and toast  Maintenance IVF's @ 75 ml/hr  Dilaudid PCA for analgesia, add oral regimen  Incentive spirometry  OOB/ambulate  SQH/SCD's  PT/OT       Admission Orders: Date/Time/Statement:   Admission Orders (From admission, onward)     Ordered        02/01/22 1151  Inpatient Admission  Once                      Orders Placed This Encounter   Procedures    Inpatient Admission     Standing Status:   Standing     Number of Occurrences:   1     Order Specific Question:   Level of Care     Answer:   Level 2 Stepdown / HOT [14]     Order Specific Question:   Estimated length of stay     Answer:   More than 2 Midnights     Order Specific Question:   Certification     Answer:   I certify that inpatient services are medically necessary for this patient for a duration of greater than two midnights  See H&P and MD Progress Notes for additional information about the patient's course of treatment       Vital Signs:  Date/Time Temp Pulse Resp BP MAP (mmHg) SpO2 Calculated FIO2 (%) - Nasal Cannula O2 Flow Rate (L/min) Nasal Cannula O2 Flow Rate (L/min) O2 Device   02/02/22 11:05:47 98 2 °F (36 8 °C) 62 18 115/65 82 99 % -- -- -- None (Room air)   02/02/22 0700 -- 56 -- 109/55 73 99 % 28 -- 2 L/min Nasal cannula   02/02/22 02:52:33 99 6 °F (37 6 °C) 78 -- 127/66 86 98 % -- -- -- --   02/01/22 2351 -- 61 16 130/77 95 99 % 28 -- 2 L/min Nasal cannula   02/01/22 2300 -- 86 -- 115/61 79 98 % 28 -- 2 L/min Nasal cannula   02/01/22 2200 -- 77 -- 120/72 88 99 % -- -- -- --   02/01/22 19:09:11 98 2 °F (36 8 °C) 80 -- 136/79 98 100 % -- -- -- --   02/01/22 1700 -- 79 -- 131/85 100 100 % 28 -- 2 L/min Nasal cannula   02/01/22 1400 -- -- -- 110/66 81 99 % 28 -- 2 L/min Nasal cannula   02/01/22 13:47:51 98 °F (36 7 °C) 66 16 107/64 78 99 % -- -- -- --   02/01/22 1315 -- 64 14 110/65 81 98 % 28 -- 2 L/min Nasal cannula   02/01/22 1200 -- 66 16 96/55 66 100 % -- -- -- --   02/01/22 1150 98 8 °F (37 1 °C) 62 13 99/46 Abnormal  63 98 % -- 5 L/min -- Simple mask   02/01/22 0556 -- 77 16 106/68 -- 98 % -- -- -- None (Room air)       Pertinent Labs/Diagnostic Test Results:     Results from last 7 days   Lab Units 02/02/22  0341   WBC Thousand/uL 8 49   HEMOGLOBIN g/dL 10 7*   HEMATOCRIT % 33 2*   PLATELETS Thousands/uL 218   NEUTROS ABS Thousands/µL 6 47     Results from last 7 days   Lab Units 02/02/22  0341   SODIUM mmol/L 134*   POTASSIUM mmol/L 4 2   CHLORIDE mmol/L 104   CO2 mmol/L 25   ANION GAP mmol/L 5   BUN mg/dL 12   CREATININE mg/dL 1 00   EGFR ml/min/1 73sq m 83   CALCIUM mg/dL 8 2*   MAGNESIUM mg/dL 1 9   PHOSPHORUS mg/dL 3 3     Results from last 7 days   Lab Units 02/01/22  1200   POC GLUCOSE mg/dl 142*     Results from last 7 days   Lab Units 02/02/22  0341   GLUCOSE RANDOM mg/dL 119       Medications/Pain Control:   Scheduled Medications:  acetaminophen, 975 mg, Oral, Q8H CASSY  amLODIPine, 5 mg, Oral, Daily  heparin (porcine), 5,000 Units, Subcutaneous, Q8H [facility]    Continuous IV Infusions:  dextrose 5 % and sodium chloride 0 45 % with KCl 20 mEq/L, 75 mL/hr, Intravenous, Continuous  HYDROmorphone, , Intravenous, Continuous    PRN Meds:  calcium carbonate, 500 mg, Oral, TID PRN  HYDROmorphone, 0 2 mg, Intravenous, Q3H PRN  naloxone, 0 04 mg, Intravenous, Q1MIN PRN  ondansetron, 4 mg, Intravenous, Q6H PRN  oxyCODONE, 10 mg, Oral, Q4H PRN  oxyCODONE, 5 mg, Oral, Q4H PRN        Network Utilization Review Department  ATTENTION: Please call with any questions or concerns to [phone] and carefully listen to the prompts so that you are directed to the right person  All voicemails are confidential   Mitzi Batista all requests for admission clinical reviews, approved or denied determinations and any other requests to dedicated fax number below belonging to the campus where the patient is receiving treatment   List of dedicated fax numbers for the Facilities:  [address] DENIALS (Administrative/Medical Necessity) [phone]   [address] (Maternity/NICU/Pediatrics) [phone]   [address]  [address] [facility] [place] [address] [id number] [place] [address] [address] [facility] [facility] [phone]

## 2022-02-03 PROCEDURE — 97116 GAIT TRAINING THERAPY: CPT

## 2022-02-03 PROCEDURE — 97530 THERAPEUTIC ACTIVITIES: CPT

## 2022-02-03 PROCEDURE — 97112 NEUROMUSCULAR REEDUCATION: CPT

## 2022-02-03 RX ORDER — HYDROMORPHONE HCL/PF 1 MG/ML
0.5 SYRINGE (ML) INJECTION
Status: DISCONTINUED | OUTPATIENT
Start: 2022-02-03 | End: 2022-02-04 | Stop reason: HOSPADM

## 2022-02-03 RX ADMIN — ACETAMINOPHEN 975 MG: 325 TABLET, FILM COATED ORAL at 05:25

## 2022-02-03 RX ADMIN — OXYCODONE HYDROCHLORIDE 10 MG: 10 TABLET ORAL at 14:46

## 2022-02-03 RX ADMIN — HEPARIN SODIUM 5000 UNITS: 5000 INJECTION INTRAVENOUS; SUBCUTANEOUS at 14:45

## 2022-02-03 RX ADMIN — HEPARIN SODIUM 5000 UNITS: 5000 INJECTION INTRAVENOUS; SUBCUTANEOUS at 05:25

## 2022-02-03 RX ADMIN — ACETAMINOPHEN 975 MG: 325 TABLET, FILM COATED ORAL at 21:24

## 2022-02-03 RX ADMIN — HEPARIN SODIUM 5000 UNITS: 5000 INJECTION INTRAVENOUS; SUBCUTANEOUS at 21:24

## 2022-02-03 NOTE — PLAN OF CARE
Problem: PAIN - ADULT  Goal: Verbalizes/displays adequate comfort level or baseline comfort level  Description: Interventions:  - Encourage patient to monitor pain and request assistance  - Assess pain using appropriate pain scale  - Administer analgesics based on type and severity of pain and evaluate response  - Implement non-pharmacological measures as appropriate and evaluate response  - Consider cultural and social influences on pain and pain management  - Notify physician/advanced practitioner if interventions unsuccessful or patient reports new pain  Outcome: Progressing     Problem: INFECTION - ADULT  Goal: Absence or prevention of progression during hospitalization  Description: INTERVENTIONS:  - Assess and monitor for signs and symptoms of infection  - Monitor lab/diagnostic results  - Monitor all insertion sites, i e  indwelling lines, tubes, and drains  - Monitor endotracheal if appropriate and nasal secretions for changes in amount and color  - [place] appropriate cooling/warming therapies per order  - Administer medications as ordered  - Instruct and encourage patient and family to use good hand hygiene technique  - Identify and instruct in appropriate isolation precautions for identified infection/condition  Outcome: Progressing  Goal: Absence of fever/infection during neutropenic period  Description: INTERVENTIONS:  - Monitor WBC    Outcome: Progressing     Problem: SAFETY ADULT  Goal: Patient will remain free of falls  Description: INTERVENTIONS:  - Educate patient/family on patient safety including physical limitations  - Instruct patient to call for assistance with activity   - Consult OT/PT to assist with strengthening/mobility   - Keep Call bell within reach  - Keep bed low and locked with side rails adjusted as appropriate  - Keep care items and personal belongings within reach  - Initiate and maintain comfort rounds  - Consider moving patient to room near nurses station  Outcome: Progressing  Goal: Maintain or return to baseline ADL function  Description: INTERVENTIONS:  -  Assess patient's ability to carry out ADLs; assess patient's baseline for ADL function and identify physical deficits which impact ability to perform ADLs (bathing, care of mouth/teeth, toileting, grooming, dressing, etc )  - Assess/evaluate cause of self-care deficits   - Assess range of motion  - Assess patient's mobility; develop plan if impaired  - Assess patient's need for assistive devices and provide as appropriate  - Encourage maximum independence but intervene and supervise when necessary  - Involve family in performance of ADLs  - Assess for home care needs following discharge   - Consider OT consult to assist with ADL evaluation and planning for discharge  - Provide patient education as appropriate  Outcome: Progressing  Goal: Maintains/Returns to pre admission functional level  Description: INTERVENTIONS:  - Perform BMAT or MOVE assessment daily    - Set and communicate daily mobility goal to care team and patient/family/caregiver     - Collaborate with rehabilitation services on mobility goals if consulted  - Ambulate patient 3 times a day  - Record patient progress and toleration of activity level   Outcome: Progressing     Problem: DISCHARGE PLANNING  Goal: Discharge to home or other facility with appropriate resources  Description: INTERVENTIONS:  - Identify barriers to discharge w/patient and caregiver  - Arrange for needed discharge resources and transportation as appropriate  - Identify discharge learning needs (meds, wound care, etc )  - Arrange for interpretive services to assist at discharge as needed  - Refer to Case Management Department for coordinating discharge planning if the patient needs post-hospital services based on physician/advanced practitioner order or complex needs related to functional status, cognitive ability, or social support system  Outcome: Progressing     Problem: Knowledge Deficit  Goal: Patient/family/caregiver demonstrates understanding of disease process, treatment plan, medications, and discharge instructions  Description: Complete learning assessment and assess knowledge base    Interventions:  - Provide teaching at level of understanding  - Provide teaching via preferred learning methods  Outcome: Progressing     Problem: Potential for Falls  Goal: Patient will remain free of falls  Description: INTERVENTIONS:  - Educate patient/family on patient safety including physical limitations  - Instruct patient to call for assistance with activity   - Consult OT/PT to assist with strengthening/mobility   - Keep Call bell within reach  - Keep bed low and locked with side rails adjusted as appropriate  - Keep care items and personal belongings within reach  - Initiate and maintain comfort rounds  - Consider moving patient to room near nurses station  Outcome: Progressing     Problem: MOBILITY - ADULT  Goal: Maintain or return to baseline ADL function  Description: INTERVENTIONS:  -  Assess patient's ability to carry out ADLs; assess patient's baseline for ADL function and identify physical deficits which impact ability to perform ADLs (bathing, care of mouth/teeth, toileting, grooming, dressing, etc )  - Assess/evaluate cause of self-care deficits   - Assess range of motion  - Assess patient's mobility; develop plan if impaired  - Assess patient's need for assistive devices and provide as appropriate  - Encourage maximum independence but intervene and supervise when necessary  - Involve family in performance of ADLs  - Assess for home care needs following discharge   - Consider OT consult to assist with ADL evaluation and planning for discharge  - Provide patient education as appropriate  Outcome: Progressing  Goal: Maintains/Returns to pre admission functional level  Description: INTERVENTIONS:  - Perform BMAT or MOVE assessment daily    - Set and communicate daily mobility goal to care team and patient/family/caregiver     - Collaborate with rehabilitation services on mobility goals if consulted  - Ambulate patient 3 times a day  - Record patient progress and toleration of activity level   Outcome: Progressing

## 2022-02-03 NOTE — RESTORATIVE TECHNICIAN NOTE
Restorative Technician Note      Patient Name: Estephania Virgen     [facility] Visit Date: 02/03/22  Note Type: Mobility  Patient Position Upon Consult: Bedside chair  Activity Performed: Ambulated  Assistive Device: Other (Comment) (No AD, did use HR for loss of balance episodes x2)  Patient Position at End of Consult: Bedside chair; All needs within reach    Pt had BM; nurse notified      Sriram MENSAHT, Restorative Technician

## 2022-02-03 NOTE — PROGRESS NOTES
Progress Note -  Colorectal Surgery   Doris Just 64 y o  male MRN: [id number]  Unit/Bed#: [facility] 821-01 Encounter: [id number]    Assessment:  64 y o  M with history of diverticulitis and a colovesical fistula, now s/p laparoscopic hand assisted sigmoidectomy and takedown of colovesical fistula with bilateral ureteral stent placement and removal 2/1     Afebrile  VSS on room air   Urine output 3 3L    Plan:  Advance to lo residue diet today as patient is tolerating clears/crackers/toast and is nondistended on exam  Continue to monitor for return of bowel function  D/c IVFs  D/c PCA-D as patient is not using it, continue oral regimen   Maintain chase for 2 weeks from surgery   Incentive spirometry  OOB/Ambulate  PT/OT  DVT ppx       Subjective/Objective     Subjective: No acute events overnight  Pain is controlled on prn analgesia  No flatus or BMs yet  Tolerating diet without nausea/vomiting  No fevers, chills  He ambulated three times yesterday  Objective:     Blood pressure 107/60, pulse 56, temperature 98 5 °F (36 9 °C), resp  rate 16, height 5' 7" (1 702 m), weight 68 kg (150 lb), SpO2 97 %  ,Body mass index is 23 49 kg/m²        Intake/Output Summary (Last 24 hours) at 2/3/2022 5087  Last data filed at 2/3/2022 0531  Gross per 24 hour   Intake 1338 9 ml   Output 3325 ml   Net -1986 1 ml       Invasive Devices  Report    Peripheral Intravenous Line            Peripheral IV 02/01/22 Left Wrist 1 day    Peripheral IV 02/01/22 Left;Dorsal (posterior) Hand 1 day          Drain            Urethral Catheter Non-latex 16 Fr  1 day                Physical Exam:  NAD, alert and oriented x3  Normocephalic, atraumatic  MMM  Norm resp effort on room air   Regular rate  Abd soft, nondistended, appropriately tender, incisions c/d/i  Chase in place with blood tinged urine   No calf tenderness or peripheral edema  Motor/sensation intact in distal extremities  CN grossly intact  -rash/lesions        Lab, Imaging and other studies:  CBC:   No results found for: WBC, HGB, HCT, MCV, PLT, ADJUSTEDWBC, MCH, MCHC, RDW, MPV, NRBC, CMP:   No results found for: SODIUM, K, CL, CO2, ANIONGAP, BUN, CREATININE, GLUCOSE, CALCIUM, AST, ALT, ALKPHOS, PROT, BILITOT, EGFR  VTE Pharmacologic Prophylaxis: Heparin  VTE Mechanical Prophylaxis: sequential compression device

## 2022-02-03 NOTE — PHYSICAL THERAPY NOTE
Physical Therapy Progress Note     02/03/22 1500   PT Last Visit   PT Visit Date 02/03/22   Note Type   Note Type Treatment   Pain Assessment   Pain Assessment Tool 0-10   Pain Score 5   Pain Location/Orientation Location: Abdomen; Location: Incision   Hospital Pain Intervention(s) Repositioned; Ambulation/increased activity; Emotional support   Restrictions/Precautions   Other Precautions Pain; Fall Risk   Subjective   Subjective The pt  states that he is doing much better, and that he is eager to go home  He declined any concern about doing the stairs prior to returning home  Transfers   Sit to Stand 5  Supervision   Additional items Increased time required;Verbal cues   Stand to Sit 5  Supervision   Ambulation/Elevation   Gait pattern Excessively slow; Short stride   Gait Assistance 5  Supervision   Assistive Device Rolling walker;None   Distance 300 feet  Balance   Static Sitting Good   Dynamic Sitting Good   Static Standing Fair +   Ambulatory Fair   Activity Tolerance   Activity Tolerance Patient tolerated treatment well;Patient limited by pain   Exercises   Balance training  Seated reaching / standing tolerance x 11 minutes  Assessment   Prognosis Excellent   Problem List Pain;Decreased mobility   Assessment The pt  continues to progress with improved mobility and independence  He does continue to remain limited due to pain  He was able to ambulate without the walker with no overt losses of balance  He does require increased time for gait without the walker  He deferred any stair trial, and he had no concern about completing them prior to discharge  Educated him about continued mobility at home as well as gradual progression of his mobility  Encouraged him to ambulate several times a day  Barriers to Discharge None   Goals   Patient Goals To go home  STG Expiration Date 02/12/22   PT Treatment Day 1   Plan   Treatment/Interventions Functional transfer training;LE strengthening/ROM; Elevations; Therapeutic exercise; Endurance training;Patient/family training;Bed mobility;Gait training   Progress Progressing toward goals   PT Frequency 3-5x/wk   Recommendation   PT Discharge Recommendation No rehabilitation needs   Equipment Recommended [address] Recommended Wheeled walker   AM-PAC Basic Mobility Inpatient   Turning in Bed Without Bedrails 4   Lying on Back to Sitting on Edge of Flat Bed 4   Moving Bed to Chair 4   Standing Up From Chair 4   Walk in Room 3   Climb 3-5 Stairs 3   Basic Mobility Inpatient Raw Score 22   Basic Mobility Standardized Score 47 4   Highest Level Of Mobility   JH-HLM Goal 7: Walk 25 feet or more   JH-HLM Highest Level of Mobility 8: Walk 250 feet ot more   JH-HLM Goal Achieved Yes     An AM-PAC Basic Mobility standardized score less than 40 78 suggests the patient may benefit from discharge to post-acute rehab services      Mckay Lopes, PTA

## 2022-02-03 NOTE — PLAN OF CARE
Problem: PHYSICAL THERAPY ADULT  Goal: Performs mobility at highest level of function for planned discharge setting  See evaluation for individualized goals  Description: Treatment/Interventions: ADL retraining,Functional transfer training,LE strengthening/ROM,Elevations,Therapeutic exercise,Endurance training,Patient/family training,Equipment eval/education,Bed mobility,Gait training,Compensatory technique education,Spoke to nursing,Spoke to case management,Spoke to advanced practitioner,OT  Equipment Recommended: Radha Verma   Pt is 64 y o  male seen for PT evaluation s/p admit to [facility] on 2/1/2022  Pt presenting w/ dx of colovesical fistula; diverticulitis of colon w/ perforation pt is s/p   INSERTION URETERAL CATHETERS PREOP (Bilateral) RESECTION COLON SIGMOID LAPAROSCOPIC HAND-ASSISTED AND TAKEDOWN OF COLOVESICAL FISTULA (N/A), LAPAROSCOPIC MOBILIZATION OF SPLENIC FLEXURE, FLEXIBLE SIGMOIDOSCOPY on 2/1/22   Comorbidities affecting pt's physical performance at time of assessment listed above and significant for:  has a past medical history of Diverticulitis and Hypertension  Personal factors affecting pt at time of IE include: steps to enter environment,  limited insight into impairments and  Unable to currently perform job functions  Due to acute medical issues, ongoing medical workup and management for primary dx; post op pain/ PCA; , fall risk, increased reliance on more restrictive AD compared to baseline;  decreased activity tolerance compared to baseline, increased assistance needed from caregiver at current time, continuous monitoring, trending labs multiple lines, decline in overall functional mobility status; health management issues; note unstable clinical picture (high complexity)   Prior to admission, pt reports being totally independent, not using assistive devices: 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[employer]  Will have supportive ex spouse and son staying w/ him on d/c   Pt has FF to enter home Currently pt  is requiring Shay A for bed skills; Shay for functional transfers and Close S for ambulation w/ RW 80' x1 w/ spow jovita and w/o dizziness  Pt presents functioning below baseline and currently w/ overall mobility deficits 2* to: post op pain;decreased endurance; decreased activity tolerance; impaired balance; Fatigue  multiple lines; chase and PCA pump;  (Please find additional objective findings from PT assessment regarding body systems outlined above ) Pt will continue to benefit from skilled PT interventions to address stated impairments; to maximize functional potential; for ongoing pt/ family training; and DME needs  Anticipate that with continued progress pt to d/c home with family support and possibly [facility] vs no needs  (pending progress) when medically cleared  Pt/ family agreeable to plan and goals as stated on evaluation  See flowsheet documentation for full assessment, interventions and recommendations  Outcome: Adequate for Discharge  Note: Prognosis: Excellent  Problem List: Pain,Decreased mobility  Assessment: The pt  continues to progress with improved mobility and independence  He does continue to remain limited due to pain  He was able to ambulate without the walker with no overt losses of balance  He does require increased time for gait without the walker  He deferred any stair trial, and he had no concern about completing them prior to discharge  Educated him about continued mobility at home as well as gradual progression of his mobility  Encouraged him to ambulate several times a day  Barriers to Discharge: None        PT Discharge Recommendation: No rehabilitation needs          See flowsheet documentation for full assessment

## 2022-02-04 VITALS
HEART RATE: 79 BPM | RESPIRATION RATE: 18 BRPM | HEIGHT: 67 IN | SYSTOLIC BLOOD PRESSURE: 115 MMHG | OXYGEN SATURATION: 98 % | DIASTOLIC BLOOD PRESSURE: 72 MMHG | BODY MASS INDEX: 23.54 KG/M2 | TEMPERATURE: 98.6 F | WEIGHT: 150 LBS

## 2022-02-04 PROBLEM — N32.1 COLOVESICAL FISTULA: Status: RESOLVED | Noted: 2022-02-01 | Resolved: 2022-02-04

## 2022-02-04 PROBLEM — K57.20 DIVERTICULITIS OF COLON WITH PERFORATION: Status: RESOLVED | Noted: 2022-01-22 | Resolved: 2022-02-04

## 2022-02-04 LAB
ANION GAP SERPL CALCULATED.3IONS-SCNC: 6 MMOL/L (ref 4–13)
BASOPHILS # BLD AUTO: 0.04 THOUSANDS/ΜL (ref 0–0.1)
BASOPHILS NFR BLD AUTO: 1 % (ref 0–1)
BUN SERPL-MCNC: 11 MG/DL (ref 5–25)
CALCIUM SERPL-MCNC: 8.7 MG/DL (ref 8.3–10.1)
CHLORIDE SERPL-SCNC: 106 MMOL/L (ref 100–108)
CO2 SERPL-SCNC: 26 MMOL/L (ref 21–32)
CREAT SERPL-MCNC: 0.68 MG/DL (ref 0.6–1.3)
EOSINOPHIL # BLD AUTO: 0.43 THOUSAND/ΜL (ref 0–0.61)
EOSINOPHIL NFR BLD AUTO: 5 % (ref 0–6)
ERYTHROCYTE [DISTWIDTH] IN BLOOD BY AUTOMATED COUNT: 12.6 % (ref 11.6–15.1)
GFR SERPL CREATININE-BSD FRML MDRD: 106 ML/MIN/1.73SQ M
GLUCOSE SERPL-MCNC: 85 MG/DL (ref 65–140)
HCT VFR BLD AUTO: 34.6 % (ref 36.5–49.3)
HGB BLD-MCNC: 10.9 G/DL (ref 12–17)
IMM GRANULOCYTES # BLD AUTO: 0.04 THOUSAND/UL (ref 0–0.2)
IMM GRANULOCYTES NFR BLD AUTO: 1 % (ref 0–2)
LYMPHOCYTES # BLD AUTO: 1.74 THOUSANDS/ΜL (ref 0.6–4.47)
LYMPHOCYTES NFR BLD AUTO: 22 % (ref 14–44)
MAGNESIUM SERPL-MCNC: 2.1 MG/DL (ref 1.6–2.6)
MCH RBC QN AUTO: 30 PG (ref 26.8–34.3)
MCHC RBC AUTO-ENTMCNC: 31.5 G/DL (ref 31.4–37.4)
MCV RBC AUTO: 95 FL (ref 82–98)
MONOCYTES # BLD AUTO: 0.77 THOUSAND/ΜL (ref 0.17–1.22)
MONOCYTES NFR BLD AUTO: 10 % (ref 4–12)
NEUTROPHILS # BLD AUTO: 4.97 THOUSANDS/ΜL (ref 1.85–7.62)
NEUTS SEG NFR BLD AUTO: 61 % (ref 43–75)
NRBC BLD AUTO-RTO: 0 /100 WBCS
PHOSPHATE SERPL-MCNC: 2.6 MG/DL (ref 2.7–4.5)
PLATELET # BLD AUTO: 224 THOUSANDS/UL (ref 149–390)
PMV BLD AUTO: 9.8 FL (ref 8.9–12.7)
POTASSIUM SERPL-SCNC: 4 MMOL/L (ref 3.5–5.3)
RBC # BLD AUTO: 3.63 MILLION/UL (ref 3.88–5.62)
SODIUM SERPL-SCNC: 138 MMOL/L (ref 136–145)
WBC # BLD AUTO: 7.99 THOUSAND/UL (ref 4.31–10.16)

## 2022-02-04 PROCEDURE — 84100 ASSAY OF PHOSPHORUS: CPT | Performed by: STUDENT IN AN ORGANIZED HEALTH CARE EDUCATION/TRAINING PROGRAM

## 2022-02-04 PROCEDURE — 80048 BASIC METABOLIC PNL TOTAL CA: CPT | Performed by: STUDENT IN AN ORGANIZED HEALTH CARE EDUCATION/TRAINING PROGRAM

## 2022-02-04 PROCEDURE — 97116 GAIT TRAINING THERAPY: CPT

## 2022-02-04 PROCEDURE — 83735 ASSAY OF MAGNESIUM: CPT | Performed by: STUDENT IN AN ORGANIZED HEALTH CARE EDUCATION/TRAINING PROGRAM

## 2022-02-04 PROCEDURE — 97110 THERAPEUTIC EXERCISES: CPT

## 2022-02-04 PROCEDURE — 85025 COMPLETE CBC W/AUTO DIFF WBC: CPT | Performed by: STUDENT IN AN ORGANIZED HEALTH CARE EDUCATION/TRAINING PROGRAM

## 2022-02-04 RX ORDER — ACETAMINOPHEN 325 MG/1
975 TABLET ORAL EVERY 8 HOURS SCHEDULED
Refills: 0 | COMMUNITY
Start: 2022-02-04

## 2022-02-04 RX ADMIN — OXYCODONE HYDROCHLORIDE 10 MG: 10 TABLET ORAL at 14:07

## 2022-02-04 RX ADMIN — AMLODIPINE BESYLATE 5 MG: 5 TABLET ORAL at 09:45

## 2022-02-04 RX ADMIN — ACETAMINOPHEN 975 MG: 325 TABLET, FILM COATED ORAL at 06:22

## 2022-02-04 RX ADMIN — OXYCODONE HYDROCHLORIDE 10 MG: 10 TABLET ORAL at 09:45

## 2022-02-04 RX ADMIN — ACETAMINOPHEN 975 MG: 325 TABLET, FILM COATED ORAL at 14:08

## 2022-02-04 RX ADMIN — HEPARIN SODIUM 5000 UNITS: 5000 INJECTION INTRAVENOUS; SUBCUTANEOUS at 06:22

## 2022-02-04 NOTE — PLAN OF CARE
Problem: PHYSICAL THERAPY ADULT  Goal: Performs mobility at highest level of function for planned discharge setting  See evaluation for individualized goals  Description: Treatment/Interventions: ADL retraining,Functional transfer training,LE strengthening/ROM,Elevations,Therapeutic exercise,Endurance training,Patient/family training,Equipment eval/education,Bed mobility,Gait training,Compensatory technique education,Spoke to nursing,Spoke to case management,Spoke to advanced practitioner,OT  Equipment Recommended: Kristal Mandujano   Pt is 64 y o  male seen for PT evaluation s/p admit to [facility] on 2/1/2022  Pt presenting w/ dx of colovesical fistula; diverticulitis of colon w/ perforation pt is s/p   INSERTION URETERAL CATHETERS PREOP (Bilateral) RESECTION COLON SIGMOID LAPAROSCOPIC HAND-ASSISTED AND TAKEDOWN OF COLOVESICAL FISTULA (N/A), LAPAROSCOPIC MOBILIZATION OF SPLENIC FLEXURE, FLEXIBLE SIGMOIDOSCOPY on 2/1/22   Comorbidities affecting pt's physical performance at time of assessment listed above and significant for:  has a past medical history of Diverticulitis and Hypertension  Personal factors affecting pt at time of IE include: steps to enter environment,  limited insight into impairments and  Unable to currently perform job functions  Due to acute medical issues, ongoing medical workup and management for primary dx; post op pain/ PCA; , fall risk, increased reliance on more restrictive AD compared to baseline;  decreased activity tolerance compared to baseline, increased assistance needed from caregiver at current time, continuous monitoring, trending labs multiple lines, decline in overall functional mobility status; health management issues; note unstable clinical picture (high complexity)   Prior to admission, pt reports being totally independent, not using assistive devices: 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[employer]  Will have supportive ex spouse and son staying w/ him on d/c   Pt has FF to enter home Currently pt  is requiring Shay A for bed skills; Shay for functional transfers and Close S for ambulation w/ RW 80' x1 w/ spow jovita and w/o dizziness  Pt presents functioning below baseline and currently w/ overall mobility deficits 2* to: post op pain;decreased endurance; decreased activity tolerance; impaired balance; Fatigue  multiple lines; chase and PCA pump;  (Please find additional objective findings from PT assessment regarding body systems outlined above ) Pt will continue to benefit from skilled PT interventions to address stated impairments; to maximize functional potential; for ongoing pt/ family training; and DME needs  Anticipate that with continued progress pt to d/c home with family support and possibly [facility] vs no needs  (pending progress) when medically cleared  Pt/ family agreeable to plan and goals as stated on evaluation  See flowsheet documentation for full assessment, interventions and recommendations  Outcome: Adequate for Discharge  Note: Prognosis: Excellent  Problem List: Pain,Decreased skin integrity  Assessment: pt is functioning indep and w/o AD for household and community distnaces; has good  safety awareness and is cleared for dc home when medically stable  goals achieved- d/c from caseload at this time  Pt aware he is to continue ambualting at min 3x daily until time of d/c   Barriers to Discharge: None        PT Discharge Recommendation: No rehabilitation needs          See flowsheet documentation for full assessment

## 2022-02-04 NOTE — PROGRESS NOTES
Progress Note - Colorectal Surgery   [facility] 64 y o  male MRN: [id number]  Unit/Bed#: [facility] 5-12 Encounter: [id number]    Assessment:  63 y/o M w/ hx of diverticulitis, colovesical fistula, now s/p laparoscopic hand-assisted sigmoidectomy, takedown of colovesical fistula with bilateral ureteral stent placement and removal on 2/1     Plan:  --Low residue diet  --Maintain chase   --Pain control  --OOB, ambulate  --DVT ppx  --Discharge home today    Subjective/Objective     Subjective:     No acute events overnight  This morning, pt denies any complaints, pain well-controlled  Tolerating PO  Passing flatus and having bowel movements  Ambulating without difficulty  Objective:     Blood pressure 118/69, pulse 56, temperature 98 4 °F (36 9 °C), resp  rate 18, height 5' 7" (1 702 m), weight 68 kg (150 lb), SpO2 99 %  ,Body mass index is 23 49 kg/m²  Intake/Output Summary (Last 24 hours) at 2/4/2022 0442  Last data filed at 2/3/2022 2214  Gross per 24 hour   Intake 1146 45 ml   Output 1600 ml   Net -453 55 ml       Invasive Devices  Report    Peripheral Intravenous Line            Peripheral IV 02/01/22 Left Wrist 2 days    Peripheral IV 02/01/22 Left;Dorsal (posterior) Hand 2 days          Drain            Urethral Catheter Non-latex 16 Fr  2 days                Physical Exam:     GEN: NAD  HEENT: MMM  CV: RRR  Lung: normal effort  Ab: Soft, NT/ND, incisions c/d/i  Extrem: No CCE  Neuro:  A+Ox3, motor and sensation grossly intact    Lab, Imaging and other studies:CBC: No results found for: WBC, HGB, HCT, MCV, PLT, ADJUSTEDWBC, MCH, MCHC, RDW, MPV, NRBC, CMP: No results found for: SODIUM, K, CL, CO2, ANIONGAP, BUN, CREATININE, GLUCOSE, CALCIUM, AST, ALT, ALKPHOS, PROT, BILITOT, EGFR, Coagulation: No results found for: PT, INR, APTT, Urinalysis: No results found for: COLORU, CLARITYU, SPECGRAV, PHUR, LEUKOCYTESUR, NITRITE, PROTEINUA, GLUCOSEU, KETONESU, BILIRUBINUR, BLOODU, Amylase: No results found for: AMYLASE  VTE Pharmacologic Prophylaxis: Heparin  VTE Mechanical Prophylaxis: sequential compression device

## 2022-02-04 NOTE — PLAN OF CARE
Problem: PAIN - ADULT  Goal: Verbalizes/displays adequate comfort level or baseline comfort level  Description: Interventions:  - Encourage patient to monitor pain and request assistance  - Assess pain using appropriate pain scale  - Administer analgesics based on type and severity of pain and evaluate response  - Implement non-pharmacological measures as appropriate and evaluate response  - Consider cultural and social influences on pain and pain management  - Notify physician/advanced practitioner if interventions unsuccessful or patient reports new pain  Outcome: Progressing     Problem: INFECTION - ADULT  Goal: Absence or prevention of progression during hospitalization  Description: INTERVENTIONS:  - Assess and monitor for signs and symptoms of infection  - Monitor lab/diagnostic results  - Monitor all insertion sites, i e  indwelling lines, tubes, and drains  - Monitor endotracheal if appropriate and nasal secretions for changes in amount and color  - [place] appropriate cooling/warming therapies per order  - Administer medications as ordered  - Instruct and encourage patient and family to use good hand hygiene technique  - Identify and instruct in appropriate isolation precautions for identified infection/condition  Outcome: Progressing  Goal: Absence of fever/infection during neutropenic period  Description: INTERVENTIONS:  - Monitor WBC    Outcome: Progressing     Problem: DISCHARGE PLANNING  Goal: Discharge to home or other facility with appropriate resources  Description: INTERVENTIONS:  - Identify barriers to discharge w/patient and caregiver  - Arrange for needed discharge resources and transportation as appropriate  - Identify discharge learning needs (meds, wound care, etc )  - Arrange for interpretive services to assist at discharge as needed  - Refer to Case Management Department for coordinating discharge planning if the patient needs post-hospital services based on physician/advanced practitioner order or complex needs related to functional status, cognitive ability, or social support system  Outcome: Progressing     Problem: Knowledge Deficit  Goal: Patient/family/caregiver demonstrates understanding of disease process, treatment plan, medications, and discharge instructions  Description: Complete learning assessment and assess knowledge base    Interventions:  - Provide teaching at level of understanding  - Provide teaching via preferred learning methods  Outcome: Progressing

## 2022-02-04 NOTE — NURSING NOTE
Gonzalez teaching/care reviewed with pt and son  Both voiced understanding  Offered leg bag prior to discharge, pt refused

## 2022-02-04 NOTE — RESTORATIVE TECHNICIAN NOTE
Restorative Technician Note      Patient Name: [facility] Tech Visit Date: 02/04/22  Note Type: Mobility  Patient Position Upon Consult: Supine  Activity Performed: Ambulated  Patient Position at End of Consult: Bedside chair;  All needs within reach    Arelis Mendes  DPT, Restorative Technician

## 2022-02-04 NOTE — UTILIZATION REVIEW
Elective Surgical Continued Stay Review    Date: 2/4/22    POD#: 3  Current Patient Class: INPT Current Level of Care: MED-SURG    Assessment/Plan: 64 y o  male, initial surgery date 2/1 FOR INSERTION URETERAL CATHETERS PREOP (Bilateral)  RESECTION COLON SIGMOID LAPAROSCOPIC HAND-ASSISTED AND TAKEDOWN OF COLOVESICAL FISTULA    FISTULA (N/A), LAPAROSCOPIC MOBILIZATION OF SPLENIC FLEXURE, FLEXIBLE SIGMOIDOSCOPY  s/p laparoscopic hand-assisted sigmoidectomy, takedown of colovesical fistula with bilateral ureteral stent placement and removal on 2/1  No acute events overnight  This morning, pt denies any complaints, pain well-controlled  Tolerating PO  Passing flatus and having bowel movements  Ambulating without difficulty    --Low residue diet  --Maintain chase   --Pain control  --OOB, ambulate  --DVT ppx  --Discharge home today    Pertinent Labs/Diagnostic Results:       Results from last 7 days   Lab Units 02/04/22  0522 02/02/22  0341   WBC Thousand/uL 7 99 8 49   HEMOGLOBIN g/dL 10 9* 10 7*   HEMATOCRIT % 34 6* 33 2*   PLATELETS Thousands/uL 224 218   NEUTROS ABS Thousands/µL 4 97 6 47         Results from last 7 days   Lab Units 02/04/22  0522 02/02/22  0341   SODIUM mmol/L 138 134*   POTASSIUM mmol/L 4 0 4 2   CHLORIDE mmol/L 106 104   CO2 mmol/L 26 25   ANION GAP mmol/L 6 5   BUN mg/dL 11 12   CREATININE mg/dL 0 68 1 00   EGFR ml/min/1 73sq m 106 83   CALCIUM mg/dL 8 7 8 2*   MAGNESIUM mg/dL 2 1 1 9   PHOSPHORUS mg/dL 2 6* 3 3         Results from last 7 days   Lab Units 02/01/22  1200   POC GLUCOSE mg/dl 142*     Results from last 7 days   Lab Units 02/04/22  0522 02/02/22  0341   GLUCOSE RANDOM mg/dL 85 119       Vital Signs:   02/04/22 07:36:12 98 6 °F (37 °C) 79 -- 115/72 86 98 % -- -- --   02/04/22 00:28:14 98 4 °F (36 9 °C) 56 18 -- -- 99 % -- -- --                           02/03/22 1448 99 3 °F (37 4 °C) 79 -- -- -- 98 % -- -- --   02/03/22 07:38:45 98 7 °F (37 1 °C) 66 20 108/60 76 98 % -- -- -- 02/03/22 0300 -- -- -- -- -- -- -- -- None (Room air)                                       02/02/22 18:59:26 99 2 °F (37 3 °C) 60 18 102/60 74 99 % -- -- --   02/02/22 15:00:48 98 3 °F (36 8 °C) 62 18 94/50 65 97 % -- -- None (Room air)   02/02/22 11:05:47 98 2 °F (36 8 °C) 62 18 115/65 82 99 % -- -- None (Room air)   02/02/22 07:37:01 97 5 °F (36 4 °C) 64 16 112/60 77 99 % -- -- --   02/02/22 0700 -- 56 -- 109/55 73 99 % 28 2 L/min Nasal cannula   02/02/22 0500 -- 72 -- 116/59 78 98 % -- -- --   02/02/22 0400 -- 71 -- 112/65 81 98 % -- -- --   02/02/22 0300 -- 67 -- 127/66 86 98 % -- -- --   02/02/22 02:52:33 99 6 °F (37 6 °C) 78 -- 127/66 86 98 % -- -- --         Medications:   Scheduled Medications:  acetaminophen, 975 mg, Oral, Q8H CASSY  amLODIPine, 5 mg, Oral, Daily  heparin (porcine), 5,000 Units, Subcutaneous, Q8H [address]    PRN Meds:  calcium carbonate, 500 mg, Oral, TID PRN  HYDROmorphone, 0 5 mg, Intravenous, Q3H PRN  naloxone, 0 04 mg, Intravenous, Q1MIN PRN  ondansetron, 4 mg, Intravenous, Q6H PRN  oxyCODONE, 10 mg, Oral, Q4H PRN 2/3 X1, 2/4 X1  oxyCODONE, 5 mg, Oral, Q4H PRN          Discharge Plan: D    Network Utilization Review Department  ATTENTION: Please call with any questions or concerns to [phone] and carefully listen to the prompts so that you are directed to the right person  All voicemails are confidential   Renetta Gonzalez all requests for admission clinical reviews, approved or denied determinations and any other requests to dedicated fax number below belonging to the campus where the patient is receiving treatment   List of dedicated fax numbers for the Facilities:  [address] DENIALS (Administrative/Medical Necessity) [phone]   [address] [place] [address] (Maternity/NICU/Pediatrics) 261 [facility] [place] [address] Marnada Stringer [address] [place] [address] [id number] [place] [address] [address] Reyes [address] [place] [address] [phone]

## 2022-02-04 NOTE — DISCHARGE INSTRUCTIONS
DISCHARGE INSTRUCTIONS    Follow Up: Follow up with Dr Hiram Griffin in 10 days  Diet: Continue soft diet today and advance to regular diet as tolerated  Pain: Tylenol as needed  Shower: You may shower over the wound  Do not bathe or use a pool or hot tub until cleared by the physician  Activity: As tolerated  You may go up and down stairs, walk as much as you are comfortable, but walk at least 3 times each day  Do not lift anything heavier than 15 pounds for at least 2-4 weeks, unless cleared by the doctor  Driving: Do not drive or make any important decisions while on narcotic pain medication  Generally, you may drive when your off all narcotic pain medications  Medications: Resume all of your previous medications, unless told otherwise by the doctor  You do not need to take the narcotic pain medications unless you are having significant pain and discomfort  Call the office: If you are experiencing any of the following, fevers above 101 5° or chills, significant nausea or vomiting, increase in abdominal pain, if the wound develops drainage and/or is excessive redness around the wound, or if you have significant diarrhea or other worsening symptoms

## 2022-02-04 NOTE — PHYSICAL THERAPY NOTE
PHYSICAL THERAPY TREATMENT  NAME:  July Cronin  DATE: 02/04/22    AGE:   64 y o  Mrn:   [id number]  ADMIT DX:  Diverticulitis [K57 [id number]]    Past Medical History:   Diagnosis Date    Diverticulitis     Hypertension      Past Surgical History:   Procedure Laterality Date    NO PAST SURGERIES      NV LAP,SURG,COLECTOMY, PARTIAL, W/ANAST N/A 2/1/2022    Procedure: RESECTION COLON SIGMOID LAPAROSCOPIC HAND-ASSISTED AND TAKEDOWN OF COLOVESICAL FISTULA;  Surgeon: Eugene Meza MD;  Location: [facility];  Service: Colorectal       Length Of Stay: 3     02/04/22 1115   PT Last Visit   PT Visit Date 02/04/22   Note Type   Note Type Treatment   Pain Assessment   Pain Assessment Tool 0-10   Pain Score 4   Restrictions/Precautions   Weight Bearing Precautions Per Order No   Other Precautions Pain;Multiple lines   Cognition   Overall Cognitive Status WFL   Arousal/Participation Alert; Cooperative   Attention Within functional limits   Orientation Level Oriented X4   Memory Within functional limits   Following Commands Follows all commands and directions without difficulty   Comments cooperative and motivated- reports up and walking 4x daily- now w/o AD   Bed Mobility   Supine to Sit 6  Modified independent   Sit to Supine 6  Modified independent   Transfers   Sit to Stand 6  Modified independent   Stand to Sit 6  Modified independent   Stand pivot 6  Modified independent   Ambulation/Elevation   Gait pattern WNL   Gait Assistance 7  Independent   Assistive Device None   Distance > 300   Stair Management Assistance 5  Supervision   Balance   Static Sitting Good   Dynamic Sitting Good   Static Standing Good   Dynamic Standing Good   Ambulatory Fair   Endurance Deficit   Endurance Deficit No   Endurance Deficit Description progressing    Activity Tolerance   Medical Staff Made Aware RN- restorative    Assessment   Prognosis Excellent   Problem List Pain;Decreased skin integrity   Assessment pt is functioning indep and w/o AD for household and community distnaces; has good  safety awareness and is cleared for dc home when medically stable  goals achieved- d/c from caseload at this time  Pt aware he is to continue ambualting at min 3x daily until time of d/c    Barriers to Discharge None   Goals   Patient Goals go home    PT Treatment Day 3   Plan   Treatment/Interventions Functional transfer training;LE strengthening/ROM; Elevations; Therapeutic exercise; Endurance training;Patient/family training;Equipment eval/education; Bed mobility;Gait training; Compensatory technique education;Spoke to nursing;Spoke to case management   Progress Discontinue PT   Recommendation   PT Discharge Recommendation No rehabilitation needs   AM-PAC Basic Mobility Inpatient   Turning in Bed Without Bedrails 4   Lying on Back to Sitting on Edge of Flat Bed 4   Moving Bed to Chair 4   Standing Up From Chair 4   Walk in Room 4   Climb 3-5 Stairs 4   Basic Mobility Inpatient Raw Score 24   Basic Mobility Standardized Score 57 68   Highest Level Of Mobility   JH-HLM Goal 8: Walk 250 feet or more   JH-HLM Highest Level of Mobility 8: Walk 250 feet ot more   JH-HLM Goal Achieved Yes            Jan Trinidad, PT

## 2022-02-04 NOTE — DISCHARGE SUMMARY
Discharge Summary - Colorectal Surgery  Geno Golden 64 y o  male MRN: [id number]  [facility] Encounter: [id number]    Admission Date: 2/1/2022     Discharge Date: 2/4/2022    Admitting Diagnosis: Diverticulitis [K57 [id number]]    Discharge Diagnosis: Diverticulitis with colovesical fistula status post laparoscopic hand assisted sigmoidectomy with colovesical fistula takedown with preoperative bilateral ureteral stent placement by Dr Melissa Braswell    Attending and Service: Dr Melissa Braswell , Colorectal Surgical Services  Consulting Physician(s): None    Imaging and Procedures Performed:   2/1/2022: lap hand assisted sigmoidectomy with colovesical fistula takedown with preop B/L ureteral stent placement and removal - Kaplan     Pathology: Pending    Hospital Course: Geno Golden is a 75-year-old male with PMHx of HTN presented for elective laparoscopic hand assisted sigmoidectomy with colovesical fistula takedown and preoperative bilateral ureteral stents by Dr Melissa Braswell  Patient underwent surgical intervention without complications  Postoperatively he was transferred to the medical-surgical floor with a Gonzalez in place, clear liquid diet, IV fluids, PCA Dilaudid for pain control, incentive spirometer, and DVT prophylaxis  Throughout his hospital stay patient's diet was slowly advanced as awaited bowel function return  By postoperative day 4 patient was tolerating diet without nausea or vomiting, urine output was appropriate through Gonzalez, pain was controlled on p o  Pain control regimen, he was out of bed and ambulating without any difficulties, he was cleared from a surgical standpoint to be discharged home with [address] [place] [address]  All discharge instructions were discussed with the patient as well as postoperative follow-up appointments  He is to follow-up with Dr Melissa Braswell in 10 days  Patient is in agreement with plan  Patient declined narcotic pain control regimen, only wanted Tylenol for discharge      On discharge, the patient is instructed to follow-up with the patient's primary care provider within the next 2 weeks to review the events of the recent hospitalization  The patient is instructed to follow the provided discharge instructions  Condition at Discharge: good     Discharge instructions/Information to patient and family:   See after visit summary for information provided to patient and family  Provisions for Follow-Up Care:  See after visit summary for information related to follow-up care and any pertinent home health orders  Disposition: Home w/ [facility]    Planned Readmission: No    Discharge Statement   I spent 30 minutes discharging the patient  This time was spent on the day of discharge  I had direct contact with the patient on the day of discharge  Additional documentation is required if more than 30 minutes were spent on discharge  Discharge Medications:  See after visit summary for reconciled discharge medications provided to patient and family      Em Tejeda PA-C  2/4/2022  12:41 PM

## 2022-02-07 ENCOUNTER — TRANSITIONAL CARE MANAGEMENT (OUTPATIENT)
Dept: INTERNAL MEDICINE CLINIC | Facility: CLINIC | Age: 57
End: 2022-02-07

## 2022-02-07 NOTE — UTILIZATION REVIEW
Elective Surgical Continued Stay Review    Date: 2/4/22    POD#: 3  Current Patient Class: INPT Current Level of Care: MED-SURG    Assessment/Plan: 64 y o  male, initial surgery date 2/1 FOR INSERTION URETERAL CATHETERS PREOP (Bilateral)  RESECTION COLON SIGMOID LAPAROSCOPIC HAND-ASSISTED AND TAKEDOWN OF COLOVESICAL FISTULA    FISTULA (N/A), LAPAROSCOPIC MOBILIZATION OF SPLENIC FLEXURE, FLEXIBLE SIGMOIDOSCOPY  s/p laparoscopic hand-assisted sigmoidectomy, takedown of colovesical fistula with bilateral ureteral stent placement and removal on 2/1  No acute events overnight  This morning, pt denies any complaints, pain well-controlled  Tolerating PO  Passing flatus and having bowel movements  Ambulating without difficulty    --Low residue diet  --Maintain chase   --Pain control  --OOB, ambulate  --DVT ppx  --Discharge home today    Pertinent Labs/Diagnostic Results:       Results from last 7 days   Lab Units 02/04/22  0522 02/02/22  0341   WBC Thousand/uL 7 99 8 49   HEMOGLOBIN g/dL 10 9* 10 7*   HEMATOCRIT % 34 6* 33 2*   PLATELETS Thousands/uL 224 218   NEUTROS ABS Thousands/µL 4 97 6 47         Results from last 7 days   Lab Units 02/04/22  0522 02/02/22  0341   SODIUM mmol/L 138 134*   POTASSIUM mmol/L 4 0 4 2   CHLORIDE mmol/L 106 104   CO2 mmol/L 26 25   ANION GAP mmol/L 6 5   BUN mg/dL 11 12   CREATININE mg/dL 0 68 1 00   EGFR ml/min/1 73sq m 106 83   CALCIUM mg/dL 8 7 8 2*   MAGNESIUM mg/dL 2 1 1 9   PHOSPHORUS mg/dL 2 6* 3 3         Results from last 7 days   Lab Units 02/01/22  1200   POC GLUCOSE mg/dl 142*     Results from last 7 days   Lab Units 02/04/22  0522 02/02/22  0341   GLUCOSE RANDOM mg/dL 85 119       Vital Signs:   02/04/22 07:36:12 98 6 °F (37 °C) 79 -- 115/72 86 98 % -- -- --   02/04/22 00:28:14 98 4 °F (36 9 °C) 56 18 -- -- 99 % -- -- --                           02/03/22 1448 99 3 °F (37 4 °C) 79 -- -- -- 98 % -- -- --   02/03/22 07:38:45 98 7 °F (37 1 °C) 66 20 108/60 76 98 % -- -- -- 02/03/22 0300 -- -- -- -- -- -- -- -- None (Room air)                                       02/02/22 18:59:26 99 2 °F (37 3 °C) 60 18 102/60 74 99 % -- -- --   02/02/22 15:00:48 98 3 °F (36 8 °C) 62 18 94/50 65 97 % -- -- None (Room air)   02/02/22 11:05:47 98 2 °F (36 8 °C) 62 18 115/65 82 99 % -- -- None (Room air)   02/02/22 07:37:01 97 5 °F (36 4 °C) 64 16 112/60 77 99 % -- -- --   02/02/22 0700 -- 56 -- 109/55 73 99 % 28 2 L/min Nasal cannula   02/02/22 0500 -- 72 -- 116/59 78 98 % -- -- --   02/02/22 0400 -- 71 -- 112/65 81 98 % -- -- --   02/02/22 0300 -- 67 -- 127/66 86 98 % -- -- --   02/02/22 02:52:33 99 6 °F (37 6 °C) 78 -- 127/66 86 98 % -- -- --         Medications:   Scheduled Medications:  acetaminophen, 975 mg, Oral, Q8H CASSY  amLODIPine, 5 mg, Oral, Daily  heparin (porcine), 5,000 Units, Subcutaneous, Q8H [address]    PRN Meds:  calcium carbonate, 500 mg, Oral, TID PRN  HYDROmorphone, 0 5 mg, Intravenous, Q3H PRN  naloxone, 0 04 mg, Intravenous, Q1MIN PRN  ondansetron, 4 mg, Intravenous, Q6H PRN  oxyCODONE, 10 mg, Oral, Q4H PRN 2/3 X1, 2/4 X1  oxyCODONE, 5 mg, Oral, Q4H PRN          Discharge Plan: TBD    Network Utilization Review Department  ATTENTION: Please call with any questions or concerns to [phone] and carefully listen to the prompts so that you are directed to the right person  All voicemails are confidential   Merrick Delude all requests for admission clinical reviews, approved or denied determinations and any other requests to dedicated fax number below belonging to the campus where the patient is receiving treatment   List of dedicated fax numbers for the Facilities:  [address] DENIALS (Administrative/Medical Necessity) [phone]   [address] (Maternity/NICU/Pediatrics) 261 [facility],7Th Floor [place] [address] Maranda Still [address] [address] [facility]  Marina Reyes [address] [facility] [facility] [phone]       Notification of Discharge   This is a Notification of Discharge from our facility [address]  Please be advised that this patient has been discharge from our facility  Below you will find the admission and discharge date and time including the patients disposition  UTILIZATION REVIEW CONTACT:  Estrellita Cohen  Utilization   Network Utilization Review Department  Phone: [phone] x carefully listen to the prompts  All voicemails are confidential   Email: [email or web address]     PHYSICIAN ADVISORY SERVICES:  FOR [id number]-[id number] REVIEW - MEDICAL NECESSITY DENIAL  Phone: [phone]  Fax: [phone]  Email: [email or web address]     PRESENTATION DATE: 2/1/2022  5:23 AM  OBERVATION ADMISSION DATE:   INPATIENT ADMISSION DATE: 2/1/22 11:51 AM   DISCHARGE DATE: 2/4/2022  3:27 PM  DISPOSITION: Home with [address] INFORMATION:  Send all requests for admission clinical reviews, approved or denied determinations and any other requests to dedicated fax number below belonging to the campus where the patient is receiving treatment   List of dedicated fax numbers:  [address] [facility] [address] DENIALS (Administrative/Medical Necessity) [phone]   [address] (Maternity/NICU/Pediatrics) [phone]   [facility] [address] [facility] Marianela [address] [place] [address],[address]Th Floor [address] [phone]   [facility]  [phone]   2205 [facility]  [address] [facility] 1000 [facility] [phone]

## 2022-02-17 ENCOUNTER — TELEPHONE (OUTPATIENT)
Dept: INTERNAL MEDICINE CLINIC | Facility: CLINIC | Age: 57
End: 2022-02-17

## 2022-02-17 NOTE — TELEPHONE ENCOUNTER
PT called to check when Dr Nallely Barroso wants to see him after his surg or if he wants to pt to schedule and appt      Would like a return call either way pls    [phone]

## 2022-10-23 DIAGNOSIS — I10 ESSENTIAL HYPERTENSION: ICD-10-CM

## 2022-10-24 RX ORDER — AMLODIPINE BESYLATE 5 MG/1
TABLET ORAL
Qty: 100 TABLET | Refills: 3 | Status: SHIPPED | OUTPATIENT
Start: 2022-10-24

## 2024-02-21 PROBLEM — Z00.00 HEALTHCARE MAINTENANCE: Status: RESOLVED | Noted: 2018-05-30 | Resolved: 2024-02-21

## 2024-11-25 ENCOUNTER — TELEPHONE (OUTPATIENT)
Age: 59
End: 2024-11-25

## 2024-12-06 ENCOUNTER — TELEPHONE (OUTPATIENT)
Age: 59
End: 2024-12-06

## 2024-12-06 NOTE — TELEPHONE ENCOUNTER
Patients GI provider:  Dr. Kaplan    Number to return call: [phone]    Reason for call: Christie from [facility] called in to request 2022 colonoscopy report. Please fax report to [phone], thank you.    Scheduled procedure/appointment date if applicable: Apt/procedure n/a

## (undated) DEVICE — ECHELON CONTOUR W/ BLUE RELOAD: Brand: ECHELON

## (undated) DEVICE — ANTIBACTERIAL UNDYED BRAIDED (POLYGLACTIN 910), SYNTHETIC ABSORBABLE SUTURE: Brand: COATED VICRYL

## (undated) DEVICE — GLOVE SRG BIOGEL ECLIPSE 7.5

## (undated) DEVICE — TRAVELKIT CONTAINS FIRST STEP KIT (200ML EP-4 KIT) AND SOILED SCOPE BAG - 1 KIT: Brand: TRAVELKIT CONTAINS FIRST STEP KIT AND SOILED SCOPE BAG

## (undated) DEVICE — SUT PROLENE 2-0 KS 30 IN 8623H

## (undated) DEVICE — SUT PDS PLUS 1 CTX 36IN PDP371T

## (undated) DEVICE — TRAY FOLEY 16FR URIMETER SILICONE SURESTEP

## (undated) DEVICE — POOLE SUCTION HANDLE: Brand: CARDINAL HEALTH

## (undated) DEVICE — ECHELON CIRCULAR POWERED STAPLER: Brand: ECHELON CIRCULAR

## (undated) DEVICE — 3000CC GUARDIAN II: Brand: GUARDIAN

## (undated) DEVICE — PLUMEPEN PRO 10FT

## (undated) DEVICE — 3M™ IOBAN™ 2 ANTIMICROBIAL INCISE DRAPE 6650EZ: Brand: IOBAN™ 2

## (undated) DEVICE — INSUFLATION TUBING INSUFLOW (LEXION)

## (undated) DEVICE — TROCAR: Brand: KII FIOS FIRST ENTRY

## (undated) DEVICE — GUIDEWIRE .038 X 145

## (undated) DEVICE — GLOVE INDICATOR PI UNDERGLOVE SZ 8 BLUE

## (undated) DEVICE — SUT MONOCRYL 4-0 PS-2 18 IN Y496G

## (undated) DEVICE — GLOVE SRG BIOGEL 7.5

## (undated) DEVICE — 40595 XL TRENDELENBURG POSITIONING KIT: Brand: 40595 XL TRENDELENBURG POSITIONING KIT

## (undated) DEVICE — PACK PBDS STERILE LAP LITHOTOMY RF

## (undated) DEVICE — ADHESIVE SKIN HIGH VISCOSITY EXOFIN 1ML

## (undated) DEVICE — ENDOPATH ECHELON ENDOSCOPIC LINEAR CUTTER RELOADS, GREEN, 60MM: Brand: ECHELON ENDOPATH

## (undated) DEVICE — SUT VICRYL PLUS 0 UR-6 27IN VCP603H

## (undated) DEVICE — CO2 AND WATER TUBING/CAP SET FOR OLYMPUS® SCOPES & UCR: Brand: ERBE

## (undated) DEVICE — ECHELON CONTOUR GST RELOAD GREEN: Brand: ECHELON

## (undated) DEVICE — ECHELON FLEX 60 ARTICULATING ENDOSCOPIC LINEAR CUTTER (NO CARTRIDGE): Brand: ECHELON FLEX ENDOPATH

## (undated) DEVICE — SPONGE 4 X 4 XRAY 16 PLY STRL LF RFD

## (undated) DEVICE — UNDER BUTTOCKS DRAPE W/FLUID CONTROL POUCH: Brand: CONVERTORS

## (undated) DEVICE — CATH URETHERAL CONNECTOR

## (undated) DEVICE — IRRIG ENDO FLO TUBING

## (undated) DEVICE — STERILE CYSTO PACK: Brand: CARDINAL HEALTH

## (undated) DEVICE — PAD GROUNDING ADULT

## (undated) DEVICE — SUT SILK 2-0 TIES 144 IN LA55G

## (undated) DEVICE — INTENDED FOR TISSUE SEPARATION, AND OTHER PROCEDURES THAT REQUIRE A SHARP SURGICAL BLADE TO PUNCTURE OR CUT.: Brand: BARD-PARKER SAFETY BLADES SIZE 11, STERILE

## (undated) DEVICE — TROCARS: Brand: KII® BALLOON BLUNT TIP SYSTEM

## (undated) DEVICE — INTENDED FOR TISSUE SEPARATION, AND OTHER PROCEDURES THAT REQUIRE A SHARP SURGICAL BLADE TO PUNCTURE OR CUT.: Brand: BARD-PARKER SAFETY BLADES SIZE 15, STERILE

## (undated) DEVICE — SUT VICRYL 0 REEL 54 IN J287G

## (undated) DEVICE — CATH URETERAL 5FR X 70 CM FLEX TIP POLYUR BARD

## (undated) DEVICE — VISUALIZATION SYSTEM: Brand: CLEARIFY

## (undated) DEVICE — Device: Brand: DEFENDO AIR/WATER/SUCTION AND BIOPSY VALVE